# Patient Record
Sex: FEMALE | Race: WHITE
[De-identification: names, ages, dates, MRNs, and addresses within clinical notes are randomized per-mention and may not be internally consistent; named-entity substitution may affect disease eponyms.]

---

## 2017-02-10 RX ORDER — LEVOTHYROXINE SODIUM 75 MCG
TABLET ORAL
Qty: 90 TABLET | Refills: 1 | Status: SHIPPED | OUTPATIENT
Start: 2017-02-10 | End: 2017-08-07 | Stop reason: SDUPTHER

## 2017-07-06 ENCOUNTER — TELEPHONE (OUTPATIENT)
Dept: OTHER | Facility: CLINIC | Age: 52
End: 2017-07-06

## 2018-01-08 ENCOUNTER — TELEPHONE (OUTPATIENT)
Dept: INTERNAL MEDICINE CLINIC | Age: 53
End: 2018-01-08

## 2018-01-08 RX ORDER — LEVOTHYROXINE SODIUM 75 MCG
TABLET ORAL
Qty: 30 TABLET | Refills: 0 | Status: SHIPPED | OUTPATIENT
Start: 2018-01-08 | End: 2018-02-16 | Stop reason: SDUPTHER

## 2018-01-08 NOTE — TELEPHONE ENCOUNTER
Pt is going out of town on Wednesday. She stated that she has 30 pills left of her Synthroid. But she will be out of town til early February. She is requesting a early refill for Synthroid because she is afraid she may run out.   Please call meds into [facility] [address], [address] [facility]

## 2018-02-16 ENCOUNTER — OFFICE VISIT (OUTPATIENT)
Dept: INTERNAL MEDICINE CLINIC | Age: 53
End: 2018-02-16

## 2018-02-16 VITALS
DIASTOLIC BLOOD PRESSURE: 58 MMHG | SYSTOLIC BLOOD PRESSURE: 98 MMHG | HEART RATE: 74 BPM | OXYGEN SATURATION: 100 % | BODY MASS INDEX: 19.84 KG/M2 | WEIGHT: 112 LBS

## 2018-02-16 DIAGNOSIS — M85.89 OSTEOPENIA OF MULTIPLE SITES: ICD-10-CM

## 2018-02-16 DIAGNOSIS — E03.4 HYPOTHYROIDISM DUE TO ACQUIRED ATROPHY OF THYROID: ICD-10-CM

## 2018-02-16 DIAGNOSIS — Z12.31 ENCOUNTER FOR SCREENING MAMMOGRAM FOR BREAST CANCER: ICD-10-CM

## 2018-02-16 PROCEDURE — 1036F TOBACCO NON-USER: CPT | Performed by: INTERNAL MEDICINE

## 2018-02-16 PROCEDURE — G8427 DOCREV CUR MEDS BY ELIG CLIN: HCPCS | Performed by: INTERNAL MEDICINE

## 2018-02-16 PROCEDURE — 99213 OFFICE O/P EST LOW 20 MIN: CPT | Performed by: INTERNAL MEDICINE

## 2018-02-16 PROCEDURE — G8420 CALC BMI NORM PARAMETERS: HCPCS | Performed by: INTERNAL MEDICINE

## 2018-02-16 PROCEDURE — 3017F COLORECTAL CA SCREEN DOC REV: CPT | Performed by: INTERNAL MEDICINE

## 2018-02-16 PROCEDURE — 3014F SCREEN MAMMO DOC REV: CPT | Performed by: INTERNAL MEDICINE

## 2018-02-16 PROCEDURE — G8484 FLU IMMUNIZE NO ADMIN: HCPCS | Performed by: INTERNAL MEDICINE

## 2018-02-16 RX ORDER — LEVOTHYROXINE SODIUM 75 MCG
TABLET ORAL
Qty: 90 TABLET | Refills: 3 | Status: SHIPPED | OUTPATIENT
Start: 2018-02-16 | End: 2018-03-12

## 2018-02-16 ASSESSMENT — PATIENT HEALTH QUESTIONNAIRE - PHQ9
SUM OF ALL RESPONSES TO PHQ QUESTIONS 1-9: 0
1. LITTLE INTEREST OR PLEASURE IN DOING THINGS: 0
2. FEELING DOWN, DEPRESSED OR HOPELESS: 0
SUM OF ALL RESPONSES TO PHQ9 QUESTIONS 1 & 2: 0

## 2018-02-16 NOTE — PROGRESS NOTES
Results   Component Value Date    TSHREFLEX 0.23 09/13/2013     Lab Results   Component Value Date    TSH 2.68 11/15/2016    TSH 0.66 11/13/2015    TSH 2.58 10/24/2014     Osteopenia:  Taking adult gummies consistently, but unsure how much calcium and vitamin D they contain. Has been on dairy free diet for the past 30 days. Runs daily and plays tennis weekly, [employer] classes. Review of Systems  As documented in HPI     Physical Exam   Constitutional: She is oriented to person, place, and time. She appears well-developed and well-nourished. No distress. HENT:   Mouth/Throat: Oropharynx is clear and moist and mucous membranes are normal. No oropharyngeal exudate. Neck: Trachea normal and phonation normal. No JVD present. No tracheal deviation present. Thyromegaly (small goiter- no palpable nodules, non-tender) present. Cardiovascular: Normal rate, regular rhythm, normal heart sounds and intact distal pulses. Exam reveals no gallop and no friction rub. No murmur heard. Pulmonary/Chest: Effort normal and breath sounds normal. No stridor. No respiratory distress. She has no wheezes. She has no rales. She exhibits no tenderness. Lymphadenopathy:     She has no cervical adenopathy. Neurological: She is alert and oriented to person, place, and time. Skin: Skin is warm and dry. No rash noted. No erythema. Psychiatric: She has a normal mood and affect.  Her behavior is normal.

## 2018-02-16 NOTE — PATIENT INSTRUCTIONS
Calcium and Vitamin D     Why do I need calcium and vitamin D? Calcium and vitamin D are important for bone health. Nerves, muscles, and blood vessels need calcium to work. Vitamin D helps the body absorb calcium, and is needed for immune system function. There is some evidence that vitamin D helps prevent cancer and cardiovascular disease. What are sources of calcium and vitamin D? Calcium is found in foods. Dairy products are good sources. Eight ounces of yogurt (228 gram) or milk (1 cup [236 mL]), or a 1.5-oz. (43 gram) serving of cheese, can provide around 300 mg. Fortified orange juice can provide 300 mg per 8-oz. (236 mL) serving. Vitamin D is made by sun-exposed skin and is found in some foods. One of the best sources is salmon. A 3-oz. (86 gram) serving of sockeye salmon provides almost 800 IU. A 3-oz. serving of tuna canned in water provides about 150 IU. Dairy products fortified with vitamin D are good sources. Examples include a cup of fortified milk (115 to 124 IU), a cup of fortified orange juice (80 IU), or 6-ozs. (171 grams) of fortified yogurt (80 IU). Calcium and vitamin D are also available as supplements. Do I need a supplement? Are they safe? Many people are low on vitamin D. It is hard to get enough vitamin D from food, and most people don't get much sun exposure because they use sunscreens, spend long hours indoors, or live at a [address] Most people need a vitamin D supplement. Ask if you should have your vitamin D level checked. People typically get 300 mg calcium from their diet daily, not including dairy. If you include two servings of high-calcium foods (e.g., dairy), you can get around 900 mg per day. Supplementation with just 300 mg of calcium daily, or adding a third high-calcium serving, will provide 1200 mg daily. You may have heard calcium supplements are unsafe.  While there has been negative press about heart attacks and prostate cancer, calcium supplements have not

## 2018-03-12 RX ORDER — LEVOTHYROXINE SODIUM 75 MCG
TABLET ORAL
Qty: 30 TABLET | Refills: 5 | Status: SHIPPED | OUTPATIENT
Start: 2018-03-12 | End: 2018-09-02 | Stop reason: SDUPTHER

## 2018-03-12 NOTE — TELEPHONE ENCOUNTER
The following message was left on the Non-Emergency Line:  Patient calling to requesting a refill for Synthroid be sent to [facility].

## 2018-09-03 RX ORDER — LEVOTHYROXINE SODIUM 75 MCG
TABLET ORAL
Qty: 30 TABLET | Refills: 5 | Status: SHIPPED | OUTPATIENT
Start: 2018-09-03 | End: 2019-03-05 | Stop reason: SDUPTHER

## 2018-10-13 ENCOUNTER — TELEPHONE (OUTPATIENT)
Dept: PAIN MANAGEMENT | Age: 53
End: 2018-10-13

## 2018-12-13 ENCOUNTER — TELEPHONE (OUTPATIENT)
Dept: INTERNAL MEDICINE CLINIC | Age: 53
End: 2018-12-13

## 2018-12-13 NOTE — TELEPHONE ENCOUNTER
I spoke with patient and she is working out of town currently.   She will get completed by the end of Feb.

## 2019-03-01 ENCOUNTER — OFFICE VISIT (OUTPATIENT)
Dept: INTERNAL MEDICINE CLINIC | Age: 54
End: 2019-03-01
Payer: COMMERCIAL

## 2019-03-01 VITALS
HEIGHT: 63 IN | DIASTOLIC BLOOD PRESSURE: 60 MMHG | WEIGHT: 112 LBS | SYSTOLIC BLOOD PRESSURE: 100 MMHG | HEART RATE: 70 BPM | BODY MASS INDEX: 19.84 KG/M2 | OXYGEN SATURATION: 99 %

## 2019-03-01 DIAGNOSIS — Z78.0 POST-MENOPAUSAL: ICD-10-CM

## 2019-03-01 DIAGNOSIS — Z12.31 ENCOUNTER FOR SCREENING MAMMOGRAM FOR BREAST CANCER: ICD-10-CM

## 2019-03-01 DIAGNOSIS — E03.4 HYPOTHYROIDISM DUE TO ACQUIRED ATROPHY OF THYROID: Primary | ICD-10-CM

## 2019-03-01 DIAGNOSIS — M85.89 OSTEOPENIA OF MULTIPLE SITES: ICD-10-CM

## 2019-03-01 PROCEDURE — G8484 FLU IMMUNIZE NO ADMIN: HCPCS | Performed by: INTERNAL MEDICINE

## 2019-03-01 PROCEDURE — 1036F TOBACCO NON-USER: CPT | Performed by: INTERNAL MEDICINE

## 2019-03-01 PROCEDURE — 3017F COLORECTAL CA SCREEN DOC REV: CPT | Performed by: INTERNAL MEDICINE

## 2019-03-01 PROCEDURE — G8427 DOCREV CUR MEDS BY ELIG CLIN: HCPCS | Performed by: INTERNAL MEDICINE

## 2019-03-01 PROCEDURE — G8420 CALC BMI NORM PARAMETERS: HCPCS | Performed by: INTERNAL MEDICINE

## 2019-03-01 PROCEDURE — 99214 OFFICE O/P EST MOD 30 MIN: CPT | Performed by: INTERNAL MEDICINE

## 2019-03-01 ASSESSMENT — PATIENT HEALTH QUESTIONNAIRE - PHQ9
2. FEELING DOWN, DEPRESSED OR HOPELESS: 0
SUM OF ALL RESPONSES TO PHQ QUESTIONS 1-9: 0
SUM OF ALL RESPONSES TO PHQ9 QUESTIONS 1 & 2: 0
1. LITTLE INTEREST OR PLEASURE IN DOING THINGS: 0
SUM OF ALL RESPONSES TO PHQ QUESTIONS 1-9: 0

## 2019-03-05 ENCOUNTER — TELEPHONE (OUTPATIENT)
Dept: INTERNAL MEDICINE CLINIC | Age: 54
End: 2019-03-05

## 2019-03-05 ENCOUNTER — NURSE ONLY (OUTPATIENT)
Dept: INTERNAL MEDICINE CLINIC | Age: 54
End: 2019-03-05
Payer: COMMERCIAL

## 2019-03-05 DIAGNOSIS — Z12.11 COLON CANCER SCREENING: Primary | ICD-10-CM

## 2019-03-05 LAB
CONTROL: NORMAL
HEMOCCULT STL QL: NEGATIVE

## 2019-03-05 PROCEDURE — 82274 ASSAY TEST FOR BLOOD FECAL: CPT | Performed by: INTERNAL MEDICINE

## 2019-03-05 RX ORDER — LEVOTHYROXINE SODIUM 75 MCG
TABLET ORAL
Qty: 90 TABLET | Refills: 1 | Status: SHIPPED | OUTPATIENT
Start: 2019-03-05 | End: 2019-08-30 | Stop reason: SDUPTHER

## 2019-07-12 ENCOUNTER — APPOINTMENT (RX ONLY)
Dept: URBAN - METROPOLITAN AREA CLINIC 170 | Facility: CLINIC | Age: 54
Setting detail: DERMATOLOGY
End: 2019-07-12

## 2019-07-12 DIAGNOSIS — L81.4 OTHER MELANIN HYPERPIGMENTATION: ICD-10-CM

## 2019-07-12 DIAGNOSIS — L82.1 OTHER SEBORRHEIC KERATOSIS: ICD-10-CM

## 2019-07-12 DIAGNOSIS — L20.89 OTHER ATOPIC DERMATITIS: ICD-10-CM

## 2019-07-12 DIAGNOSIS — D22 MELANOCYTIC NEVI: ICD-10-CM

## 2019-07-12 DIAGNOSIS — D18.0 HEMANGIOMA: ICD-10-CM

## 2019-07-12 PROBLEM — D22.5 MELANOCYTIC NEVI OF TRUNK: Status: ACTIVE | Noted: 2019-07-12

## 2019-07-12 PROBLEM — D18.01 HEMANGIOMA OF SKIN AND SUBCUTANEOUS TISSUE: Status: ACTIVE | Noted: 2019-07-12

## 2019-07-12 PROBLEM — L30.9 DERMATITIS, UNSPECIFIED: Status: ACTIVE | Noted: 2019-07-12

## 2019-07-12 PROCEDURE — ? SUNSCREEN RECOMMENDATIONS

## 2019-07-12 PROCEDURE — ? PRESCRIPTION

## 2019-07-12 PROCEDURE — 99213 OFFICE O/P EST LOW 20 MIN: CPT

## 2019-07-12 PROCEDURE — ? COUNSELING

## 2019-07-12 RX ORDER — TRIAMCINOLONE ACETONIDE 1 MG/G
CREAM TOPICAL
Qty: 1 | Refills: 1 | Status: ERX | COMMUNITY
Start: 2019-07-12

## 2019-07-12 RX ADMIN — TRIAMCINOLONE ACETONIDE 1: 1 CREAM TOPICAL at 00:00

## 2019-07-12 ASSESSMENT — LOCATION SIMPLE DESCRIPTION DERM
LOCATION SIMPLE: RIGHT LOWER BACK
LOCATION SIMPLE: LEFT POSTERIOR UPPER ARM
LOCATION SIMPLE: UPPER BACK
LOCATION SIMPLE: RIGHT SHOULDER
LOCATION SIMPLE: LEFT CALF
LOCATION SIMPLE: ABDOMEN

## 2019-07-12 ASSESSMENT — LOCATION DETAILED DESCRIPTION DERM
LOCATION DETAILED: EPIGASTRIC SKIN
LOCATION DETAILED: LEFT PROXIMAL POSTERIOR UPPER ARM
LOCATION DETAILED: INFERIOR THORACIC SPINE
LOCATION DETAILED: LEFT DISTAL CALF
LOCATION DETAILED: RIGHT INFERIOR MEDIAL MIDBACK
LOCATION DETAILED: RIGHT ANTERIOR SHOULDER

## 2019-07-12 ASSESSMENT — LOCATION ZONE DERM
LOCATION ZONE: LEG
LOCATION ZONE: ARM
LOCATION ZONE: TRUNK

## 2019-07-12 NOTE — HPI: SKIN LESION
What Type Of Note Output Would You Prefer (Optional)?: Bullet Format
How Severe Is Your Skin Lesion?: mild
Has Your Skin Lesion Been Treated?: not been treated
Is This A New Presentation, Or A Follow-Up?: Skin Lesions
Additional History: Patient has one area on neck raised and itchy

## 2019-07-12 NOTE — HPI: RASH
What Type Of Note Output Would You Prefer (Optional)?: Bullet Format
How Severe Is Your Rash?: mild
Is This A New Presentation, Or A Follow-Up?: Rash
Additional History: Patient tried to do otc for two weeks

## 2019-08-30 ENCOUNTER — TELEPHONE (OUTPATIENT)
Dept: INTERNAL MEDICINE CLINIC | Age: 54
End: 2019-08-30

## 2019-08-30 RX ORDER — LEVOTHYROXINE SODIUM 75 MCG
TABLET ORAL
Qty: 90 TABLET | Refills: 1 | Status: SHIPPED | OUTPATIENT
Start: 2019-08-30 | End: 2020-02-28 | Stop reason: SDUPTHER

## 2019-08-30 NOTE — TELEPHONE ENCOUNTER
Patient left message on non-emergency line stating she is out of town and forgot to take her Synthroid. Patient wants to verify that it is okay to be without this medication for a few days. Please verify with patient at phone number provided. Patient also states she doesn't have any refills on this medication. There is 1 refill listed. Please clarify with patient.

## 2020-02-28 ENCOUNTER — TELEPHONE (OUTPATIENT)
Dept: INTERNAL MEDICINE CLINIC | Age: 55
End: 2020-02-28

## 2020-02-28 RX ORDER — LEVOTHYROXINE SODIUM 75 MCG
TABLET ORAL
Qty: 90 TABLET | Refills: 1 | Status: SHIPPED | OUTPATIENT
Start: 2020-02-28 | End: 2020-09-03 | Stop reason: SDUPTHER

## 2020-02-28 NOTE — TELEPHONE ENCOUNTER
Last appointment: 3/1/2019  Next appointment: Left message for patient to call need follow up  Last refill: 8/30/19

## 2020-02-28 NOTE — TELEPHONE ENCOUNTER
Patient scheduled. She would like a 90 day supply sent to pharmacy. Patient also is out of town for the 3 weeks and scheduled when she will be back in town.   Thank you

## 2020-02-28 NOTE — TELEPHONE ENCOUNTER
Patient called requesting refill of SYNTHROID 75 MCG tablet   Please send to [facility] [address] [facility] [phone] Lexi Rodriguez [phone]  Patient can be reached with any questions.   Thank you

## 2020-09-03 RX ORDER — LEVOTHYROXINE SODIUM 75 MCG
TABLET ORAL
Qty: 90 TABLET | Refills: 1 | Status: SHIPPED | OUTPATIENT
Start: 2020-09-03 | End: 2020-09-15 | Stop reason: SDUPTHER

## 2020-09-03 NOTE — TELEPHONE ENCOUNTER
----- Message from Fatoumata White sent at 9/3/2020  8:34 AM EDT -----  Subject: Refill Request    QUESTIONS  Name of Medication? SYNTHROID 75 MCG tablet  Patient-reported dosage and instructions? 1 tab a day   How many days do you have left? 9  [facility] Pharm[facility] [address] [address] [facility] (if available)? [phone]  Additional Information for Provider? patient states will be about 10 pills   short before her apt on the 15th and wanted to get her refill before.  ---------------------------------------------------------------------------  --------------  CALL BACK INFO  What is the best way for the office to contact you? OK to leave message on   voicemail  Preferred Call Back Phone Number?  [phone]

## 2020-09-14 NOTE — PROGRESS NOTES
9/15/2020    TELEHEALTH EVALUATION -- Audio/Visual (During QVYWN-04 public health emergency)    HPI:  Maryan Humphreys (:  1965) has requested an audio/video evaluation for the following concern(s):    Presyncope:  Has had several episodes of presyncope over the past 6 months. Has been dealing with marital separation over the past 9 months, which has been very stressful. Episodes tend to occur while meeting with  and estranged . No CP, SOB, palpitations or decrease in exercise tolerance. No episodes at work or in social situations. Patient denies anhedonia, depressed mood, difficulty concentrating, feelings of worthlessness/guilt, hopelessness, hypersomnia, insomnia, impaired memory, change in appetite or suicidal ideation. Has good support from family and friends. Hypothyroidism: Recent symptoms: none.  She denies fatigue, weight gain, heat or cold intolerance, constipation, dysphagia, diarrhea and palpitations. Patient is taking her medication consistently on an empty stomach.       Prior to Visit Medications    Medication Sig Taking? Authorizing Provider   Calcium 500 MG CHEW chewable tablet Take 500 mg by mouth 3 times daily as needed Yes Historical Provider, MD   ibuprofen (ADVIL;MOTRIN) 200 MG tablet Take 200 mg by mouth every 6 hours as needed for Pain Yes Historical Provider, MD   albuterol (PROAIR HFA) 108 (90 BASE) MCG/ACT inhaler Inhale 2 puffs into the lungs every 4 hours as needed for Wheezing Yes Pamella Harley MD   Multiple Vitamins-Minerals (MULTIVITAMIN GUMMIES ADULTS PO) Take 1 each by mouth daily.  Yes Historical Provider, MD   SYNTHROID 75 MCG tablet TAKE ONE TABLET BY MOUTH DAILY  Patient not taking: Reported on 9/15/2020  Pamella Harley MD       Wt Readings from Last 3 Encounters:   19 112 lb (50.8 kg)   18 112 lb (50.8 kg)   11/15/16 115 lb (52.2 kg)     BP Readings from Last 3 Encounters:   19 100/60   18 (!) 98/58   11/15/16 (!) 98/58 Patient-Reported Vitals 9/15/2020   Patient-Reported Weight 107lb   Patient-Reported Height -         Review of Systems:   As documented in HPI     Physical Exam  Constitutional:       General: She is not in acute distress. Appearance: She is well-developed. HENT:      Head: Normocephalic and atraumatic. Mouth/Throat:      Lips: No lesions. Neck:      Comments: No visible mass  Pulmonary:      Effort: Pulmonary effort is normal. No respiratory distress. Skin:     Comments: No rash, erythema or other discoloration on facial skin and exposed areas of neck and upper extremites    Neurological:      Mental Status: She is alert. Comments: No facial asymmetry (cranial nerve 7 motor function) or gaze palsy   Psychiatric:         Attention and Perception: Attention normal.         Mood and Affect: Mood normal.         Speech: Speech normal.         Behavior: Behavior normal.         Thought Content: Thought content normal.         Cognition and Memory: Cognition normal.          Lab Results   Component Value Date     (H) 10/24/2014    LDLCALC 108 (H) 10/24/2014     No results found for: GLUF, LABA1C  Lab Results   Component Value Date     11/15/2016    K 4.6 11/15/2016    BUN 20 11/15/2016    CREATININE 0.8 11/15/2016    LABGLOM >60 11/15/2016    GFRAA >60 11/15/2016    CALCIUM 9.8 11/15/2016    VITD25 52.4 02/16/2018     Lab Results   Component Value Date    ALT 15 11/15/2016    AST 25 11/15/2016     Lab Results   Component Value Date    HGB 12.5 06/23/2014     Lab Results   Component Value Date    TSHREFLEX 0.23 (L) 09/13/2013    TSH 2.13 03/01/2019           ASSESSMENT/PLAN:  1. Pre-syncope  Clinical picture most consistent with vasovagal etiology secondary to stressful events related to marital separation. Since she is feeling well otherwise and has no symptoms of depression, medication is not currently warranted. Recommended CBT with Dr. Jorge Cosby, which she will schedule. Patient will call if symptoms change or worsen. 2. Hypothyroidism due to acquired atrophy of thyroid  Clinically euthyroid, but will adjust levothyroxine dose if indicated by lab results. She will be switching from brand name to generic levothyroxine due to cost, so may need more frequent lab monitoring.  - TSH without Reflex; Future    3. Thyroid nodule  Asymptomatic. Not currently interested in repeating US. 4. Osteopenia of multiple sites  Continue vitamin D and calcium supplements at current doses and weight-bearing exercise. Not currently interested in repeating dexa scan- last completed in 2014.    5. Multiple nevi  Requests dermatology referral.  - Santino Walker MD, Dermatology, [facility]    Return in about 6 months (around 3/15/2021) for 30 MIN F/U- Video. An  electronic signature was used to authenticate this note. --Tamar Esqueda MD on 9/15/2020 at 3:54 PM    Pursuant to the emergency declaration under the [facility], [facility] waiver authority and the [employer] and [employer] Act, this Virtual  Visit was conducted, with patient's consent, to reduce the patient's risk of exposure to COVID-19 and provide continuity of care for an established patient. Services were provided through a video synchronous discussion virtually to substitute for in-person clinic visit.

## 2020-09-15 ENCOUNTER — TELEMEDICINE (OUTPATIENT)
Dept: INTERNAL MEDICINE CLINIC | Age: 55
End: 2020-09-15

## 2020-09-15 ENCOUNTER — TELEPHONE (OUTPATIENT)
Dept: INTERNAL MEDICINE CLINIC | Age: 55
End: 2020-09-15

## 2020-09-15 PROBLEM — D22.9 MULTIPLE NEVI: Status: ACTIVE | Noted: 2020-09-15

## 2020-09-15 PROBLEM — R55 PRE-SYNCOPE: Status: ACTIVE | Noted: 2020-09-15

## 2020-09-15 PROCEDURE — G8427 DOCREV CUR MEDS BY ELIG CLIN: HCPCS | Performed by: INTERNAL MEDICINE

## 2020-09-15 PROCEDURE — 99214 OFFICE O/P EST MOD 30 MIN: CPT | Performed by: INTERNAL MEDICINE

## 2020-09-15 PROCEDURE — 3017F COLORECTAL CA SCREEN DOC REV: CPT | Performed by: INTERNAL MEDICINE

## 2020-09-15 RX ORDER — LEVOTHYROXINE SODIUM 0.07 MG/1
TABLET ORAL
Qty: 90 TABLET | Refills: 1 | Status: SHIPPED | OUTPATIENT
Start: 2020-09-15 | End: 2021-03-16 | Stop reason: SDUPTHER

## 2020-09-15 ASSESSMENT — PATIENT HEALTH QUESTIONNAIRE - PHQ9
SUM OF ALL RESPONSES TO PHQ QUESTIONS 1-9: 0
1. LITTLE INTEREST OR PLEASURE IN DOING THINGS: 0
2. FEELING DOWN, DEPRESSED OR HOPELESS: 0
SUM OF ALL RESPONSES TO PHQ QUESTIONS 1-9: 0
SUM OF ALL RESPONSES TO PHQ9 QUESTIONS 1 & 2: 0

## 2020-09-15 NOTE — LETTER
[facility]  [address] [facility] [address]  Phone: [phone]  Fax: [phone]    Tamar Esqueda MD        September 15, 2020    Coco Lopez  [address] [place] [address]      To whom it may concern:    Breanna Castillo has experienced several recent episodes of pre-syncope, which may be triggered or exacerbated by stress. It is my medical opinion that she should avoid stressful situations as much as possible until this issue can be evaluated and treated.     Sincerely,        Tamar Esqueda MD

## 2020-09-15 NOTE — PATIENT INSTRUCTIONS
[facility] - Janeen Michel MD  [address], [address] [facility], [address]  Ph: [phone]    Outside Dermatologists:    Cedrick Abel.  Isiah Skiff, MD Lauree Babcock)  [phone]     [address] [facility]  Linda Starkey, General Dermatology  [facility] - atypical wounds  [phone] [facility] [phone]  Aiyana Ann             [phone]  [address]  [address] [facility]

## 2020-09-15 NOTE — Clinical Note
Please contact patient to schedule follow-up appointment as documented. She also would like to schedule an appointment with Dr. Elise Rinne.

## 2020-09-15 NOTE — LETTER
[facility]  [address] [facility] [address]  Phone: [phone]  Fax: [phone]    Uriel Pisano MD        September 15, 2020    Anderson Steele  [address] [place], [place] [id number]      To whom it may concern:    Jyoti Dodd has experienced several recent episodes of pre-syncope, which may be triggered or exacerbated by stress. It is my medical opinion that she should avoid stressful situations as much as possible until this issue can be evaluated and treated.     Sincerely,        Uriel Pisano MD

## 2020-09-15 NOTE — TELEPHONE ENCOUNTER
Spoke with patient. [facility] says virtual visits during [facility] are no charge to the patient. Please issue refund. If there is any issue, please call patient.

## 2020-09-15 NOTE — TELEPHONE ENCOUNTER
----- Message from Luis Reardonnic sent at 9/15/2020 12:39 PM EDT -----  Subject: Message to Provider    QUESTIONS  Information for Provider? Patient has paid her copay for her office visit   that is set for today at 4. Her insurance says there is no charge for this   visit and she would like a refund.  ---------------------------------------------------------------------------  --------------  [address]  What is the best way for the office to contact you? OK to leave message on   voicemail  Preferred Call Back Phone Number? [phone]  ---------------------------------------------------------------------------  --------------  SCRIPT ANSWERS  Relationship to Patient?  Self

## 2020-09-23 ENCOUNTER — TELEPHONE (OUTPATIENT)
Dept: INTERNAL MEDICINE CLINIC | Age: 55
End: 2020-09-23

## 2020-09-23 NOTE — TELEPHONE ENCOUNTER
Patient calling stating she is entitled to 8 free office visits with Dr. Britney Mckeon. Please don't bill insurance.

## 2020-09-25 NOTE — TELEPHONE ENCOUNTER
Called patient to discuss billing issue. I am not aware of [facility] offering 10 free visits. Maybe she gets 10 free visits through her employer, so she wants her EAP billed and not her health insurance. I am not sure how this works though. I would have to look into it or if she has instructions for her EAP, I could follow those. I called and left a voicemail to discuss this with her.

## 2020-09-25 NOTE — TELEPHONE ENCOUNTER
I know that [address] [facility] offers 10 free visits to see a therapist, so perhaps, [facility] does as well. I just wasn't sure as to whether or not we attach the insurance information, or do the do not bill insurance option.

## 2020-10-02 ENCOUNTER — VIRTUAL VISIT (OUTPATIENT)
Dept: PSYCHOLOGY | Age: 55
End: 2020-10-02
Payer: COMMERCIAL

## 2020-10-02 PROCEDURE — 90791 PSYCH DIAGNOSTIC EVALUATION: CPT | Performed by: PSYCHOLOGIST

## 2020-10-02 NOTE — PROGRESS NOTES
Behavioral Health Consultation  Elizabeth Cee Psy.D. Psychologist  10/2/2020  Start time 3:30pm Stop time 4:10pm    Time spent with Patient: 40 minutes  This is patient's first [facility] appointment. Reason for Consult:  anxiety  Referring Provider: PCP    Feedback for PCP: No action needed. Writer will continue to follow pt. At initial visit, pt provided informed consent for the behavioral health program. Discussed with patient model of service to include the limits of confidentiality (i.e. abuse reporting, suicide intervention, etc.) and short-term intervention focused approach. Pt indicated understanding    TELEHEALTH VISIT -- Audio and video (During GVTLB-05 public health emergency)  }  Pursuant to the emergency declaration under the [address] [place] [address], [facility] waiver authority and the [employer] Act, this visit was conducted, with patient's consent, to reduce the patient's risk of exposure to COVID-19 and provide continuity of care for an established patient. Services were provided through a telehealth discussion to substitute for in-person clinic visit. Pt gave verbal informed consent to participate in telehealth services. Consent:  She and/or health care decision maker is aware that that she may receive a bill for this service, depending on her insurance coverage, and has provided verbal consent to proceed: Yes    Conducted a risk-benefit analysis and determined that the patient's presenting problems are consistent with the use of telepsychology. Determined that the patient has sufficient knowledge and skills in the use of technology enabling them to adequately benefit from telepsychology. It was determined that this patient was able to be properly treated without an in-person session. Patient verified that they were currently located at the [facility] address that was provided during registration.     Verified the following information:  Patient's identification: Yes  Patient location: [address] [place] [address]  Patient's call back number: [phone]   Patient's emergency contact's name and number, as well as permission to contact them if needed: Extended Emergency Contact Information  Primary Emergency Contact: Fausto Evans  Mobile Phone: [phone]  Relation: Child  Preferred language: English     Provider location: [place], [address] affirm this is an episode with a patient who has not had a related appointment within my department in the past 7 days or scheduled within the next 24 hours. S:    Patient reports that she has been dealing with stress related to her divorce. Her  is a , which makes the situation more complex. Her  had an affair in 2016 and asked for a divorce in January 2019. He eventually moved out in December 2019. Patient states that she is hoping to make this marriage work, but  is not open to this. He filed a restraining order. She feels that as Christians, they should be models for maintaining their marriage. Patient expresses that she gets very anxious when she has to talk to her  or do anything related to the divorce.  Depression sx: Denied   Anxiety sx: excessive worry, uncontrollable worry, irritability, muscle tension and sleep disturbance, worries about divorce and wanting to make her marriage work.    SI/HI: Vashti Collins Coping skills: distancing self from thoughts about divorce, talking to friends    History:    Health habits:   Sleep: good   Exercise: unknown   Medication adherence: yes    Psychiatric history:   Current psychotropic medications: Denied   Past mental health treatment: saw a therapist once and found it helpful; couple's counseling one visit    Social History:    Social supports:  (asked for divorce 12/2019), family, friends, is not able to talk to friends from [employer] due to restraining order   Family psychiatric history: Denied   Employment: [employer]   Sabianist/spiritual beliefs: Scientologist    Social History     Tobacco Use    Smoking status: Never Smoker    Smokeless tobacco: Never Used   Substance Use Topics    Alcohol use: Yes     Comment: Occasional      Social History     Substance and Sexual Activity   Drug Use No      Current Outpatient Medications   Medication Sig Dispense Refill    levothyroxine (SYNTHROID) 75 MCG tablet TAKE ONE TABLET BY MOUTH DAILY 90 tablet 1    Calcium 500 MG CHEW chewable tablet Take 500 mg by mouth 3 times daily as needed      ibuprofen (ADVIL;MOTRIN) 200 MG tablet Take 200 mg by mouth every 6 hours as needed for Pain      albuterol (PROAIR HFA) 108 (90 BASE) MCG/ACT inhaler Inhale 2 puffs into the lungs every 4 hours as needed for Wheezing 1 Inhaler 3    Multiple Vitamins-Minerals (MULTIVITAMIN GUMMIES ADULTS PO) Take 1 each by mouth daily. No current facility-administered medications for this visit. O:  MSE:    Appearance: good hygiene   Attitude: cooperative and friendly  Consciousness: alert  Orientation: oriented to person, place, time, general circumstance  Memory: recent and remote memory intact  Attention/Concentration: intact during session  Psychomotor Activity:normal  Eye Contact: normal  Speech: normal rate and volume, well-articulated  Mood: anxious  Affect: anxious  Perception: within normal limits  Thought Content: within normal limits  Thought Process: logical, coherent and goal-directed  Insight: good  Judgment: intact  Ability to understand instructions: Yes  Ability to respond meaningfully: Yes  Morbid Ideation: no   Suicide Assessment: no suicidal ideation, plan, or intent  Homicidal Ideation: no    A:  Administered PHQ-9 (see below).   PHQ Scores 9/15/2020 3/1/2019 2/16/2018 5/13/2016   PHQ2 Score 0 0 0 0   PHQ9 Score 0 0 0 0     Interpretation of Total Score Depression Severity: 1-4 = Minimal depression, 5-9 = Mild depression, 10-14 = Moderate depression, 15-19 = Moderately severe depression, 20-27 = Severe depression    Assessment/Progress in treatment/Treatment plan:  Patient appears to be experiencing anxiety, exacerbated by going through a divorce and wishing that she could get back together with her . Current coping skills include positive self-talk and factors maintaining symptoms include lack of acceptance of current situation. May benefit from brief intervention from writer for adaptive coping skill development. Will refer to specialty mental health in the future if indicated. Diagnosis:    1. Adjustment disorder with anxious mood       Patient Active Problem List   Diagnosis    Hypothyroidism    Thyroid nodule    Osteopenia    Pre-syncope    Multiple nevi        Plan:  Set the following goals: 1) identify top of 5 values at this time in your life. Brainstormed different options that you have currently and rank these options based on values 2) diaphragmatic breathing    Follow-up:   Return in about 2 weeks (around 10/16/2020).      Pt interventions:  Established rapport, [place]-setting to identify pt's primary goals for [employer] [facility] visit / overall health, Supportive techniques, Provided Psychoeducation re: anxiety, Engaged in treatment planning, Engaged in values clarification, Set plan for engaging in more value-guided action and Trained in diaphragmatic breathing

## 2020-10-05 ENCOUNTER — TELEPHONE (OUTPATIENT)
Dept: PSYCHOLOGY | Age: 55
End: 2020-10-05

## 2020-10-05 NOTE — LETTER
[facility]  [address] [facility] [address] [facility] [address]  Phone: [phone]  Fax: [phone]    Shellia Sandifer, PSYD        October 5, 2020    To whom it may concern,    Alvin Randolph is receiving behavioral health treatment from this provider due to stress and anxiety associated with current divorce proceedings. She will be receiving treatment on a bi-weekly basis for 6-10 visits.       Sincerely,          Shellia Sandifer, PSYD

## 2020-10-06 ENCOUNTER — TELEPHONE (OUTPATIENT)
Dept: INTERNAL MEDICINE CLINIC | Age: 55
End: 2020-10-06

## 2020-10-06 NOTE — TELEPHONE ENCOUNTER
----- Message from Yanira Santacruz sent at 10/6/2020  1:32 PM EDT -----  Subject: Message to Provider    QUESTIONS  Information for Provider? Patient would like to schedule appointment with   Dr. Roxana Zavala. She could no longer hold on and hung up . Please send message   to staff to call patient back. ---------------------------------------------------------------------------  --------------  [facility]  What is the best way for the office to contact you? OK to leave message on   voicemail  Preferred Call Back Phone Number? [phone]  ---------------------------------------------------------------------------  --------------  SCRIPT ANSWERS  Relationship to Patient?  Self

## 2020-10-09 ENCOUNTER — VIRTUAL VISIT (OUTPATIENT)
Dept: PSYCHOLOGY | Age: 55
End: 2020-10-09
Payer: COMMERCIAL

## 2020-10-09 PROCEDURE — 90832 PSYTX W PT 30 MINUTES: CPT | Performed by: PSYCHOLOGIST

## 2020-10-09 NOTE — PATIENT INSTRUCTIONS
Identify what it is that you need from your  and ask for it. If she cannot provide you with that, then you can seek another . Discussed what postponing is really doing for it. Is it actually helping.

## 2020-10-09 NOTE — PROGRESS NOTES
Behavioral Health Consultation  Chery Chaves Psy.D. Psychologist  10/9/2020  Start time 8:03am Stop time 8:36am    Time spent with Patient: 33 minutes  This is patient's second [facility] appointment. Reason for Consult:  anxiety  Referring Provider: PCP    Feedback for PCP: No action needed. Writer will continue to follow pt. At initial visit, pt provided informed consent for the behavioral health program. Discussed with patient model of service to include the limits of confidentiality (i.e. abuse reporting, suicide intervention, etc.) and short-term intervention focused approach. Pt indicated understanding    TELEHEALTH VISIT -- Audio and video (During DXUVD-24 public health emergency)  }  Pursuant to the emergency declaration under the [facility], [facility] waiver authority and the [employer] and [employer] Act, this visit was conducted, with patient's consent, to reduce the patient's risk of exposure to COVID-19 and provide continuity of care for an established patient. Services were provided through a telehealth discussion to substitute for in-person clinic visit. Pt gave verbal informed consent to participate in telehealth services. Consent:  She and/or health care decision maker is aware that that she may receive a bill for this service, depending on her insurance coverage, and has provided verbal consent to proceed: Yes    Conducted a risk-benefit analysis and determined that the patient's presenting problems are consistent with the use of telepsychology. Determined that the patient has sufficient knowledge and skills in the use of technology enabling them to adequately benefit from telepsychology. It was determined that this patient was able to be properly treated without an in-person session. Patient verified that they were currently located at the [facility] address that was provided during registration.     Verified the following information:  Patient's identification: Yes  Patient location: [address] [place] [address]  Patient's call back number: [phone]   Patient's emergency contact's name and number, as well as permission to contact them if needed: Extended Emergency Contact Information  Primary Emergency Contact: Wendy Wise  Mobile Phone: [phone]  Relation: Child  Preferred language: English     Provider location: [address] [place] [address], [address] [place] [address] this is an episode with a patient who has not had a related appointment within my department in the past 7 days or scheduled within the next 24 hours. S:    Pt set the following goals at last visit: 1) identify top of 5 values at this time in your life. Brainstormed different options that you have currently and rank these options based on values 2) diaphragmatic breathing    Patient reports that she did a values quiz. Has identified her top values as being integrity and perseverance. Identified her for options related to the divorce as being settling out of court, going to court to fight for her assets, changing the grounds for divorce, or seeking postponement of the trial.  Patient identifies that she is trying to postpone the divorce because she is hoping that her  will come back. She feels that she has \"failed\" her marriage. Is embarrassed. Is not sure who she fears judgment from.  Depression sx: Denied   Anxiety sx: excessive worry, uncontrollable worry, irritability, muscle tension and sleep disturbance, worries about divorce and wanting to make her marriage work.    SI/HI: [place] [employer] Coping skills: distancing self from thoughts about divorce, talking to friends    History:    Health habits:   Sleep: good   Exercise: unknown   Medication adherence: yes    Psychiatric history:   Current psychotropic medications: Denied   Past mental health treatment: saw a therapist once and found it helpful; couple's counseling one visit    Social History:  Social supports:  (asked for divorce 12/2019), family, friends, is not able to talk to friends from [employer] due to restraining order   Family psychiatric history: Denied   Employment: [employer]   Congregation/spiritual beliefs: Evangelical    Social History     Tobacco Use    Smoking status: Never Smoker    Smokeless tobacco: Never Used   Substance Use Topics    Alcohol use: Yes     Comment: Occasional      Social History     Substance and Sexual Activity   Drug Use No      Current Outpatient Medications   Medication Sig Dispense Refill    levothyroxine (SYNTHROID) 75 MCG tablet TAKE ONE TABLET BY MOUTH DAILY 90 tablet 1    Calcium 500 MG CHEW chewable tablet Take 500 mg by mouth 3 times daily as needed      ibuprofen (ADVIL;MOTRIN) 200 MG tablet Take 200 mg by mouth every 6 hours as needed for Pain      albuterol (PROAIR HFA) 108 (90 BASE) MCG/ACT inhaler Inhale 2 puffs into the lungs every 4 hours as needed for Wheezing 1 Inhaler 3    Multiple Vitamins-Minerals (MULTIVITAMIN GUMMIES ADULTS PO) Take 1 each by mouth daily. No current facility-administered medications for this visit.          O:  MSE:    Appearance: good hygiene   Attitude: cooperative and friendly  Consciousness: alert  Orientation: oriented to person, place, time, general circumstance  Memory: recent and remote memory intact  Attention/Concentration: intact during session  Psychomotor Activity:normal  Eye Contact: normal  Speech: normal rate and volume, well-articulated  Mood: anxious  Affect: anxious  Perception: within normal limits  Thought Content: within normal limits  Thought Process: logical, coherent and goal-directed  Insight: good  Judgment: intact  Ability to understand instructions: Yes  Ability to respond meaningfully: Yes  Morbid Ideation: no   Suicide Assessment: no suicidal ideation, plan, or intent  Homicidal Ideation: no    A:  Not completed due to COVID limitations    Assessment/Progress in treatment/Treatment

## 2020-10-15 NOTE — PROGRESS NOTES
Behavioral Health Consultation  Adán Buchanan Psy.D. Psychologist  10/16/2020    Start time 2:30pm  Stop time 3:03pm    Time spent with Patient: 33 minutes  This is patient's third [facility] appointment. Reason for Consult:  anxiety  Referring Provider: PCP    Feedback for PCP: No action needed. Writer will continue to follow pt. At initial visit, pt provided informed consent for the behavioral health program. Discussed with patient model of service to include the limits of confidentiality (i.e. abuse reporting, suicide intervention, etc.) and short-term intervention focused approach. Pt indicated understanding    TELEHEALTH VISIT -- Audio and video (During CCUCV-39 public health emergency)  }  Pursuant to the emergency declaration under the [facility], [facility] waiver authority and the [employer] and [employer] Act, this visit was conducted, with patient's consent, to reduce the patient's risk of exposure to COVID-19 and provide continuity of care for an established patient. Services were provided through a telehealth discussion to substitute for in-person clinic visit. Pt gave verbal informed consent to participate in telehealth services. Consent:  She and/or health care decision maker is aware that that she may receive a bill for this service, depending on her insurance coverage, and has provided verbal consent to proceed: Yes    Conducted a risk-benefit analysis and determined that the patient's presenting problems are consistent with the use of telepsychology. Determined that the patient has sufficient knowledge and skills in the use of technology enabling them to adequately benefit from telepsychology. It was determined that this patient was able to be properly treated without an in-person session. Patient verified that they were currently located at the [facility] address that was provided during registration.     Verified the following information:  Patient's identification: Yes  Patient location: [address] [place] [address]  Patient's call back number: [phone]   Patient's emergency contact's name and number, as well as permission to contact them if needed: Extended Emergency Contact Information  Primary Emergency Contact: Shar Pastor  Mobile Phone: [phone]  Relation: Child  Preferred language: English     Provider location: [address] [place] [address], [address] [place] [address] this is an episode with a patient who has not had a related appointment within my department in the past 7 days or scheduled within the next 24 hours. S:    Pt set the following goals at last visit: 1) identify what you are willing to do with regards to the divorce and see if your  can support you in that. 2) continue deep breathing     Patient reports that she contacted her . Her  is out of town, so she has not been able to talk to her yet. Patient told  generally what she was needing. Has not developed more specific guidelines of what she is needing. Patient reports that she has been thinking about what she wants her \"legacy\" to be. Specifically with regards to the situation. Is having difficulties identifying how she wants to approach the situation.  Depression sx: Denied   Anxiety sx: excessive worry, uncontrollable worry, irritability, muscle tension and sleep disturbance, worries about divorce and wanting to make her marriage work.    SI/HI: Eldon Johnson Coping skills: distancing self from thoughts about divorce, talking to friends    History:    Health habits:   Sleep: good   Exercise: unknown   Medication adherence: yes    Psychiatric history:   Current psychotropic medications: Denied   Past mental health treatment: saw a therapist once and found it helpful; couple's counseling one visit    Social History:    Social supports:  (asked for divorce 12/2019), family, friends, is not able to talk to friends from [employer] due to restraining order   Family psychiatric history: Denied   Employment: [employer]   Scientologist/spiritual beliefs: Mu-ism    Social History     Tobacco Use    Smoking status: Never Smoker    Smokeless tobacco: Never Used   Substance Use Topics    Alcohol use: Yes     Comment: Occasional      Social History     Substance and Sexual Activity   Drug Use No      Current Outpatient Medications   Medication Sig Dispense Refill    levothyroxine (SYNTHROID) 75 MCG tablet TAKE ONE TABLET BY MOUTH DAILY 90 tablet 1    Calcium 500 MG CHEW chewable tablet Take 500 mg by mouth 3 times daily as needed      ibuprofen (ADVIL;MOTRIN) 200 MG tablet Take 200 mg by mouth every 6 hours as needed for Pain      albuterol (PROAIR HFA) 108 (90 BASE) MCG/ACT inhaler Inhale 2 puffs into the lungs every 4 hours as needed for Wheezing 1 Inhaler 3    Multiple Vitamins-Minerals (MULTIVITAMIN GUMMIES ADULTS PO) Take 1 each by mouth daily. No current facility-administered medications for this visit.          O:  MSE:    Appearance: good hygiene   Attitude: cooperative and friendly  Consciousness: alert  Orientation: oriented to person, place, time, general circumstance  Memory: recent and remote memory intact  Attention/Concentration: intact during session  Psychomotor Activity:normal  Eye Contact: normal  Speech: normal rate and volume, well-articulated  Mood: anxious  Affect: anxious  Perception: within normal limits  Thought Content: within normal limits  Thought Process: logical, coherent and goal-directed  Insight: good  Judgment: intact  Ability to understand instructions: Yes  Ability to respond meaningfully: Yes  Morbid Ideation: no   Suicide Assessment: no suicidal ideation, plan, or intent  Homicidal Ideation: no    A:  Not completed due to COVID limitations    Assessment/Progress in treatment/Treatment plan:  Patient appears to be experiencing anxiety, exacerbated by going through a divorce and wishing that she could get back together with her . Current coping skills include positive self-talk, identifying values, and factors maintaining symptoms include lack of acceptance of current situation. Is engaged in behavioral health treatment. May benefit from additional brief intervention from writer for adaptive coping skill development. Will refer to specialty mental health in the future if indicated. Diagnosis:    1. Adjustment disorder with anxious mood       Patient Active Problem List   Diagnosis    Hypothyroidism    Thyroid nodule    Osteopenia    Pre-syncope    Multiple nevi        Plan:  Set the following     Follow-up:   Return in about 1 week (around 10/23/2020).      Pt interventions:  Established rapport, [place]-setting to identify pt's primary goals for [facility] visit / overall health, Supportive techniques, Engaged in treatment planning, Engaged in values clarification, Set plan for engaging in more value-guided action, Praised pt for use of skills and Cognitive strategies to target maladaptive thoughts including Black and white thinking

## 2020-10-16 ENCOUNTER — TELEMEDICINE (OUTPATIENT)
Dept: PSYCHOLOGY | Age: 55
End: 2020-10-16
Payer: COMMERCIAL

## 2020-10-16 PROCEDURE — 90832 PSYTX W PT 30 MINUTES: CPT | Performed by: PSYCHOLOGIST

## 2020-10-30 ENCOUNTER — TELEMEDICINE (OUTPATIENT)
Dept: PSYCHOLOGY | Age: 55
End: 2020-10-30
Payer: COMMERCIAL

## 2020-10-30 PROCEDURE — 90832 PSYTX W PT 30 MINUTES: CPT | Performed by: PSYCHOLOGIST

## 2020-10-30 NOTE — PROGRESS NOTES
Behavioral Health Consultation  Elisabet Alston Psy.D. Psychologist  10/30/2020    Start time 4:00pm  Stop time 4:35pm    Time spent with Patient: 35 minutes  This is patient's fourth [facility] appointment. Reason for Consult:  anxiety  Referring Provider: PCP    Feedback for PCP: No action needed. Writer will continue to follow pt. At initial visit, pt provided informed consent for the behavioral health program. Discussed with patient model of service to include the limits of confidentiality (i.e. abuse reporting, suicide intervention, etc.) and short-term intervention focused approach. Pt indicated understanding    TELEHEALTH VISIT -- Audio and video (During DLMKT-15 public health emergency)  }  Pursuant to the emergency declaration under the [facility], [facility] waiver authority and the [employer] and [employer] Act, this visit was conducted, with patient's consent, to reduce the patient's risk of exposure to COVID-19 and provide continuity of care for an established patient. Services were provided through a telehealth discussion to substitute for in-person clinic visit. Pt gave verbal informed consent to participate in telehealth services. Consent:  She and/or health care decision maker is aware that that she may receive a bill for this service, depending on her insurance coverage, and has provided verbal consent to proceed: Yes    Conducted a risk-benefit analysis and determined that the patient's presenting problems are consistent with the use of telepsychology. Determined that the patient has sufficient knowledge and skills in the use of technology enabling them to adequately benefit from telepsychology. It was determined that this patient was able to be properly treated without an in-person session. Patient verified that they were currently located at the [facility] address that was provided during registration.     Verified the following information:  Patient's identification: Yes  Patient location: [address] [place], [place] [id number]  Patient's call back number: [phone]   Patient's emergency contact's name and number, as well as permission to contact them if needed: Extended Emergency Contact Information  Primary Emergency Contact: Katiana Carpio [address] Phone: [phone]  Mobile Phone: [phone]  Relation: Child  Preferred language: English     Provider location: [address] affirm this is an episode with a patient who has not had a related appointment within my department in the past 7 days or scheduled within the next 24 hours. S:    Patient reports that she feels that she is started to come to a place of acceptance about her divorce. She is feeling inspired to fight for what she deserves in her divorce and hold her  accountable to his role in the [employer]. Feels that this is in line with her values. States that her court date was also pushed back to the beginning of January, which she feels good about.  Depression sx: Denied   Anxiety sx: excessive worry, uncontrollable worry, irritability, muscle tension and sleep disturbance, worries about divorce and wanting to make her marriage work.    SI/HI: [place] Regalado Coping skills: distancing self from thoughts about divorce, talking to friends    History:    Health habits:   Sleep: good   Exercise: unknown   Medication adherence: yes    Psychiatric history:   Current psychotropic medications: Denied   Past mental health treatment: saw a therapist once and found it helpful; couple's counseling one visit    Social History:    Social supports:  (asked for divorce 2019), family, friends, is not able to talk to friends from [employer] due to restraining order   Family psychiatric history: Denied   Employment: [employer]   Mandaeism/spiritual beliefs: Yazdanism    Social History     Tobacco Use    Smoking status: Never Smoker    Smokeless tobacco: Never Used Substance Use Topics    Alcohol use: Yes     Comment: Occasional      Social History     Substance and Sexual Activity   Drug Use No      Current Outpatient Medications   Medication Sig Dispense Refill    levothyroxine (SYNTHROID) 75 MCG tablet TAKE ONE TABLET BY MOUTH DAILY 90 tablet 1    Calcium 500 MG CHEW chewable tablet Take 500 mg by mouth 3 times daily as needed      ibuprofen (ADVIL;MOTRIN) 200 MG tablet Take 200 mg by mouth every 6 hours as needed for Pain      albuterol (PROAIR HFA) 108 (90 BASE) MCG/ACT inhaler Inhale 2 puffs into the lungs every 4 hours as needed for Wheezing 1 Inhaler 3    Multiple Vitamins-Minerals (MULTIVITAMIN GUMMIES ADULTS PO) Take 1 each by mouth daily. No current facility-administered medications for this visit. O:  MSE:    Appearance: good hygiene   Attitude: cooperative and friendly  Consciousness: alert  Orientation: oriented to person, place, time, general circumstance  Memory: recent and remote memory intact  Attention/Concentration: intact during session  Psychomotor Activity:normal  Eye Contact: normal  Speech: normal rate and volume, well-articulated  Mood: anxious  Affect: anxious  Perception: within normal limits  Thought Content: within normal limits  Thought Process: logical, coherent and goal-directed  Insight: good  Judgment: intact  Ability to understand instructions: Yes  Ability to respond meaningfully: Yes  Morbid Ideation: no   Suicide Assessment: no suicidal ideation, plan, or intent  Homicidal Ideation: no    A:  Not completed due to COVID limitations    Assessment/Progress in treatment/Treatment plan:  Patient appears to be experiencing anxiety, exacerbated by going through a divorce and wishing that she could get back together with her . Current coping skills include positive self-talk, identifying values, Bahai beliefs. Is engaged in behavioral health treatment.   May benefit from additional brief intervention from writer for adaptive coping skill development. Will refer to specialty mental health in the future if indicated. Diagnosis:    1. Adjustment disorder with anxious mood       Patient Active Problem List   Diagnosis    Hypothyroidism    Thyroid nodule    Osteopenia    Pre-syncope    Multiple nevi        Plan:    Follow-up:   Return in about 4 weeks (around 11/27/2020).      Pt interventions:  Established rapport, [place]-setting to identify pt's primary goals for [facility] visit / overall health, Supportive techniques, Engaged in treatment planning, Engaged in values clarification, Set plan for engaging in more value-guided action and Praised pt for use of skills

## 2020-11-18 ENCOUNTER — TELEPHONE (OUTPATIENT)
Dept: PSYCHOLOGY | Age: 55
End: 2020-11-18

## 2020-11-20 NOTE — TELEPHONE ENCOUNTER
Pt states she has a program through work where she should have 10 free visits with Dr. Ameya Tamez but she is receiving bills.  Advised patient there is currently a zero balance for her

## 2020-12-11 ENCOUNTER — TELEMEDICINE (OUTPATIENT)
Dept: PSYCHOLOGY | Age: 55
End: 2020-12-11
Payer: COMMERCIAL

## 2020-12-11 PROCEDURE — 90832 PSYTX W PT 30 MINUTES: CPT | Performed by: PSYCHOLOGIST

## 2020-12-11 NOTE — PROGRESS NOTES
address that was provided during registration. Verified the following information:  Patient's identification: Yes  Patient location: [address] [place] [id number]  Patient's call back number: [phone]   Patient's emergency contact's name and number, as well as permission to contact them if needed: Extended Emergency Contact Information  Primary Emergency Contact: Katiana Rodriguez [place] [address] Phone: [phone]  Mobile Phone: [phone]  Relation: Child  Preferred language: English     Provider location: [address] [place] [address], [address] [place] [address] affirm this is an episode with a patient who has not had a related appointment within my department in the past 7 days or scheduled within the next 24 hours. S:    Patient reports that her mood continues to be significantly improved. Has developed a plan for her divorce and feels confident in the decisions that she is making. States that her court date is on January 14. Is no longer feeling anxious about the divorce. Is starting to feel hopeful about her life moving forward. Denies need for additional follow-up at this time.      Depression sx: Denied   Anxiety sx: excessive worry, uncontrollable worry, irritability, muscle tension and sleep disturbance, worries about divorce and wanting to make her marriage work, but this has improved   SI/HI: Denied   Coping skills: distancing self from thoughts about divorce, talking to friends, reading books, lisa, prayer    History:    Health habits:   Sleep: good   Exercise: running group   Medication adherence: yes    Psychiatric history:   Current psychotropic medications: Denied   Past mental health treatment: saw a therapist once and found it helpful; couple's counseling one visit    Social History:    Social supports:  (asked for divorce 12/2019), family, friends, is not able to talk to friends from [employer] due to restraining order   Family psychiatric history: Denied   Employment: [employer]   Muslim/spiritual beliefs: Mormonism    Social History     Tobacco Use    Smoking status: Never Smoker    Smokeless tobacco: Never Used   Substance Use Topics    Alcohol use: Yes     Comment: Occasional      Social History     Substance and Sexual Activity   Drug Use No      Current Outpatient Medications   Medication Sig Dispense Refill    levothyroxine (SYNTHROID) 75 MCG tablet TAKE ONE TABLET BY MOUTH DAILY 90 tablet 1    Calcium 500 MG CHEW chewable tablet Take 500 mg by mouth 3 times daily as needed      ibuprofen (ADVIL;MOTRIN) 200 MG tablet Take 200 mg by mouth every 6 hours as needed for Pain      albuterol (PROAIR HFA) 108 (90 BASE) MCG/ACT inhaler Inhale 2 puffs into the lungs every 4 hours as needed for Wheezing 1 Inhaler 3    Multiple Vitamins-Minerals (MULTIVITAMIN GUMMIES ADULTS PO) Take 1 each by mouth daily. No current facility-administered medications for this visit. O:  MSE:    Appearance: good hygiene   Attitude: cooperative and friendly  Consciousness: alert  Orientation: oriented to person, place, time, general circumstance  Memory: recent and remote memory intact  Attention/Concentration: intact during session  Psychomotor Activity:normal  Eye Contact: normal  Speech: normal rate and volume, well-articulated  Mood: good  Affect: euthymic  Perception: within normal limits  Thought Content: within normal limits  Thought Process: logical, coherent and goal-directed  Insight: good  Judgment: intact  Ability to understand instructions: Yes  Ability to respond meaningfully: Yes  Morbid Ideation: no   Suicide Assessment: no suicidal ideation, plan, or intent  Homicidal Ideation: no    A:  Not completed due to COVID limitations    Assessment/Progress in treatment/Treatment plan:  Patient initially presented with anxiety, exacerbated by going through a divorce and wishing that she could get back together with her .  Current coping skills include positive self-talk, identifying values, Zoroastrianism beliefs. Is engaged in behavioral health treatment and is implementing new coping skills. Reports significant improvements in mood. Denies need for additional follow-up at this time. Diagnosis:    1. Adjustment disorder with anxious mood       Patient Active Problem List   Diagnosis    Hypothyroidism    Thyroid nodule    Osteopenia    Pre-syncope    Multiple nevi        Plan:    Follow-up:   Return if symptoms worsen or fail to improve.      Pt interventions:  Established rapport, [place]-setting to identify pt's primary goals for [facility] [facility] visit / overall health, Supportive techniques, Engaged in treatment planning, Engaged in values clarification, Set plan for engaging in more value-guided action and Praised pt for use of skills, terminated current episode of care

## 2021-03-15 PROBLEM — F43.22 ADJUSTMENT DISORDER WITH ANXIOUS MOOD: Status: ACTIVE | Noted: 2020-09-15

## 2021-03-16 ENCOUNTER — VIRTUAL VISIT (OUTPATIENT)
Dept: INTERNAL MEDICINE CLINIC | Age: 56
End: 2021-03-16
Payer: COMMERCIAL

## 2021-03-16 ENCOUNTER — IMMUNIZATION (OUTPATIENT)
Dept: FAMILY MEDICINE CLINIC | Age: 56
End: 2021-03-16
Payer: COMMERCIAL

## 2021-03-16 DIAGNOSIS — M85.89 OSTEOPENIA OF MULTIPLE SITES: ICD-10-CM

## 2021-03-16 DIAGNOSIS — F43.22 ADJUSTMENT DISORDER WITH ANXIOUS MOOD: ICD-10-CM

## 2021-03-16 DIAGNOSIS — E03.4 HYPOTHYROIDISM DUE TO ACQUIRED ATROPHY OF THYROID: Primary | ICD-10-CM

## 2021-03-16 DIAGNOSIS — E04.1 THYROID NODULE: ICD-10-CM

## 2021-03-16 PROCEDURE — G8427 DOCREV CUR MEDS BY ELIG CLIN: HCPCS | Performed by: INTERNAL MEDICINE

## 2021-03-16 PROCEDURE — 3017F COLORECTAL CA SCREEN DOC REV: CPT | Performed by: INTERNAL MEDICINE

## 2021-03-16 PROCEDURE — 91300 COVID-19, PFIZER VACCINE 30MCG/0.3ML DOSE: CPT | Performed by: FAMILY MEDICINE

## 2021-03-16 PROCEDURE — 0001A COVID-19, PFIZER VACCINE 30MCG/0.3ML DOSE: CPT | Performed by: FAMILY MEDICINE

## 2021-03-16 PROCEDURE — 99214 OFFICE O/P EST MOD 30 MIN: CPT | Performed by: INTERNAL MEDICINE

## 2021-03-16 RX ORDER — LEVOTHYROXINE SODIUM 0.07 MG/1
TABLET ORAL
Qty: 90 TABLET | Refills: 1 | Status: SHIPPED | OUTPATIENT
Start: 2021-03-16 | End: 2021-08-02 | Stop reason: SDUPTHER

## 2021-03-16 ASSESSMENT — PATIENT HEALTH QUESTIONNAIRE - PHQ9
1. LITTLE INTEREST OR PLEASURE IN DOING THINGS: 0
SUM OF ALL RESPONSES TO PHQ QUESTIONS 1-9: 0
SUM OF ALL RESPONSES TO PHQ QUESTIONS 1-9: 0

## 2021-03-16 NOTE — ASSESSMENT & PLAN NOTE
Doing well clinically and from lab standpoint with switch from brand name Synthroid to generic- continue current dose.

## 2021-03-16 NOTE — PROGRESS NOTES
Fit test mailed to patient.
Lips: No lesions. Neck:      Comments: No visible mass  Pulmonary:      Effort: Pulmonary effort is normal. No respiratory distress. Skin:     Comments: No rash, erythema or other discoloration on facial skin and exposed areas of neck and upper extremites    Neurological:      Mental Status: She is alert. Comments: No facial asymmetry (cranial nerve 7 motor function) or gaze palsy   Psychiatric:         Attention and Perception: Attention normal.         Mood and Affect: Mood normal.         Speech: Speech normal.         Behavior: Behavior normal.         Thought Content: Thought content normal.         Cognition and Memory: Cognition normal.       On this date 3/16/2021 I have spent 35 minutes reviewing previous notes, test results and face to face with the patient discussing the diagnosis and importance of compliance with the treatment plan as well as documenting on the day of the visit. Kye Lira is a 60 y.o. female being evaluated by a Virtual Visit (video visit) encounter to address concerns as mentioned above. A caregiver was present when appropriate. Due to this being a TeleHealth encounter (During IXN-86 public health emergency), evaluation of the following organ systems was limited: Vitals/Constitutional/EENT/Resp/CV/GI//MS/Neuro/Skin/Heme-Lymph-Imm. Pursuant to the emergency declaration under the [address] [place] [address] authority and the [employer] Act, this Virtual Visit was conducted with patient's (and/or legal guardian's) consent, to reduce the patient's risk of exposure to COVID-19 and provide necessary medical care. The patient (and/or legal guardian) has also been advised to contact this office for worsening conditions or problems, and seek emergency medical treatment and/or call 911 if deemed necessary.      Patient identification was verified at the start of the visit: Yes

## 2021-03-16 NOTE — ASSESSMENT & PLAN NOTE
Continue calcium and vitamin D supplement at current dose and weight-bearing exercise. Declines repeat dexa scan.

## 2021-04-06 ENCOUNTER — IMMUNIZATION (OUTPATIENT)
Dept: FAMILY MEDICINE CLINIC | Age: 56
End: 2021-04-06
Payer: COMMERCIAL

## 2021-04-08 PROCEDURE — 0002A COVID-19, PFIZER VACCINE 30MCG/0.3ML DOSE: CPT | Performed by: FAMILY MEDICINE

## 2021-04-08 PROCEDURE — 91300 COVID-19, PFIZER VACCINE 30MCG/0.3ML DOSE: CPT | Performed by: FAMILY MEDICINE

## 2021-05-06 ENCOUNTER — NURSE ONLY (OUTPATIENT)
Dept: INTERNAL MEDICINE CLINIC | Age: 56
End: 2021-05-06
Payer: COMMERCIAL

## 2021-05-06 DIAGNOSIS — Z12.12 SCREENING FOR COLORECTAL CANCER: Primary | ICD-10-CM

## 2021-05-06 DIAGNOSIS — E03.4 HYPOTHYROIDISM DUE TO ACQUIRED ATROPHY OF THYROID: ICD-10-CM

## 2021-05-06 DIAGNOSIS — Z12.11 SCREENING FOR COLORECTAL CANCER: Primary | ICD-10-CM

## 2021-05-06 LAB
CONTROL: ABNORMAL
HEMOCCULT STL QL: POSITIVE

## 2021-05-06 PROCEDURE — 82274 ASSAY TEST FOR BLOOD FECAL: CPT | Performed by: INTERNAL MEDICINE

## 2021-08-02 RX ORDER — LEVOTHYROXINE SODIUM 0.07 MG/1
TABLET ORAL
Qty: 90 TABLET | Refills: 0 | Status: SHIPPED | OUTPATIENT
Start: 2021-08-02 | End: 2021-12-13

## 2021-08-02 NOTE — TELEPHONE ENCOUNTER
----- Message from Beena Mendez sent at 8/2/2021 12:46 PM EDT -----  Subject: Medication Problem    QUESTIONS  Name of Medication? levothyroxine (SYNTHROID) 75 MCG tablet  Patient-reported dosage and instructions? 75 mg per day   What question or problem do you have with the medication? n/a  Preferred Pharmacy? [address] [place] [address], [address]  Pharmacy phone number (if available)? [phone]  Additional Information for Provider? PT r/s to 10/22 will need refill   before date of appt  ---------------------------------------------------------------------------  --------------  CALL BACK INFO  What is the best way for the office to contact you? OK to leave message on   voicemail  Preferred Call Back Phone Number? [phone]  ---------------------------------------------------------------------------  --------------  SCRIPT ANSWERS  Relationship to Patient?  Self

## 2021-08-20 ENCOUNTER — NURSE ONLY (OUTPATIENT)
Dept: INTERNAL MEDICINE CLINIC | Age: 56
End: 2021-08-20
Payer: COMMERCIAL

## 2021-08-20 DIAGNOSIS — Z12.11 SCREENING FOR COLON CANCER: Primary | ICD-10-CM

## 2021-08-20 LAB
CONTROL: NORMAL
HEMOCCULT STL QL: NORMAL

## 2021-08-20 PROCEDURE — 82274 ASSAY TEST FOR BLOOD FECAL: CPT | Performed by: INTERNAL MEDICINE

## 2021-08-23 ENCOUNTER — APPOINTMENT (RX ONLY)
Dept: URBAN - METROPOLITAN AREA CLINIC 170 | Facility: CLINIC | Age: 56
Setting detail: DERMATOLOGY
End: 2021-08-23

## 2021-08-23 DIAGNOSIS — D18.0 HEMANGIOMA: ICD-10-CM | Status: STABLE

## 2021-08-23 DIAGNOSIS — L82.1 OTHER SEBORRHEIC KERATOSIS: ICD-10-CM | Status: STABLE

## 2021-08-23 DIAGNOSIS — D22 MELANOCYTIC NEVI: ICD-10-CM | Status: STABLE

## 2021-08-23 DIAGNOSIS — L81.4 OTHER MELANIN HYPERPIGMENTATION: ICD-10-CM | Status: STABLE

## 2021-08-23 DIAGNOSIS — L40.8 OTHER PSORIASIS: ICD-10-CM | Status: INADEQUATELY CONTROLLED

## 2021-08-23 PROBLEM — D18.01 HEMANGIOMA OF SKIN AND SUBCUTANEOUS TISSUE: Status: ACTIVE | Noted: 2021-08-23

## 2021-08-23 PROBLEM — D22.5 MELANOCYTIC NEVI OF TRUNK: Status: ACTIVE | Noted: 2021-08-23

## 2021-08-23 PROBLEM — D48.5 NEOPLASM OF UNCERTAIN BEHAVIOR OF SKIN: Status: ACTIVE | Noted: 2021-08-23

## 2021-08-23 PROCEDURE — ? SUNSCREEN RECOMMENDATIONS

## 2021-08-23 PROCEDURE — 11102 TANGNTL BX SKIN SINGLE LES: CPT

## 2021-08-23 PROCEDURE — ? BIOPSY BY SHAVE METHOD

## 2021-08-23 PROCEDURE — ? PRESCRIPTION

## 2021-08-23 PROCEDURE — 99214 OFFICE O/P EST MOD 30 MIN: CPT | Mod: 25

## 2021-08-23 PROCEDURE — ? FULL BODY SKIN EXAM

## 2021-08-23 PROCEDURE — ? TREATMENT REGIMEN

## 2021-08-23 PROCEDURE — ? COUNSELING

## 2021-08-23 PROCEDURE — ? ADDITIONAL NOTES

## 2021-08-23 RX ORDER — KETOCONAZOLE 20 MG/ML
SHAMPOO, SUSPENSION TOPICAL
Qty: 1 | Refills: 2 | Status: ERX | COMMUNITY
Start: 2021-08-23

## 2021-08-23 RX ORDER — CLOBETASOL PROPIONATE 0.5 MG/ML
SOLUTION TOPICAL
Qty: 1 | Refills: 1 | Status: ERX | COMMUNITY
Start: 2021-08-23

## 2021-08-23 RX ADMIN — CLOBETASOL PROPIONATE 1: 0.5 SOLUTION TOPICAL at 00:00

## 2021-08-23 RX ADMIN — KETOCONAZOLE 1: 20 SHAMPOO, SUSPENSION TOPICAL at 00:00

## 2021-08-23 ASSESSMENT — SEVERITY ASSESSMENT: HOW SEVERE IS THIS PATIENT'S CONDITION?: MODERATE

## 2021-08-23 ASSESSMENT — LOCATION DETAILED DESCRIPTION DERM
LOCATION DETAILED: INFERIOR THORACIC SPINE
LOCATION DETAILED: LEFT INFERIOR PARIETAL SCALP
LOCATION DETAILED: RIGHT ANTERIOR SHOULDER
LOCATION DETAILED: EPIGASTRIC SKIN
LOCATION DETAILED: RIGHT INFERIOR MEDIAL MIDBACK
LOCATION DETAILED: RIGHT INFERIOR PARIETAL SCALP

## 2021-08-23 ASSESSMENT — LOCATION ZONE DERM
LOCATION ZONE: ARM
LOCATION ZONE: TRUNK
LOCATION ZONE: SCALP

## 2021-08-23 ASSESSMENT — LOCATION SIMPLE DESCRIPTION DERM
LOCATION SIMPLE: ABDOMEN
LOCATION SIMPLE: RIGHT SHOULDER
LOCATION SIMPLE: RIGHT LOWER BACK
LOCATION SIMPLE: SCALP
LOCATION SIMPLE: UPPER BACK

## 2021-08-23 NOTE — PROCEDURE: BIOPSY BY SHAVE METHOD
Detail Level: Detailed
Depth Of Biopsy: dermis
Was A Bandage Applied: Yes
Size Of Lesion In Cm: 0.6
X Size Of Lesion In Cm: 0
Anticipated Plan (Based On Presumed Biopsy Results): Call with results
Biopsy Type: H and E
Biopsy Method: double edge Personna blade
Anesthesia Type: 1% Xylocaine without epinephrine
Anesthesia Volume In Cc (Will Not Render If 0): 2
Hemostasis: Aluminum Chloride and Electrocautery
Wound Care: Bacitracin
Dressing: Band-Aid
Destruction After The Procedure: No
Type Of Destruction Used: Electrodesiccation and Curettage
Curettage Text: The wound bed was treated with curettage after the biopsy was performed.
Cryotherapy Text: The wound bed was treated with cryotherapy after the biopsy was performed.
Electrodesiccation Text: The wound bed was treated with electrodesiccation after the biopsy was performed.
Electrodesiccation And Curettage Text: The wound bed was treated with electrodesiccation and curettage after the biopsy was performed.
Silver Nitrate Text: The wound bed was treated with silver nitrate after the biopsy was performed.
Lab: -[id number]
Lab Facility: [id number]
Consent: Written consent was obtained and risks were reviewed including but not limited to scarring, infection, bleeding, scabbing, incomplete removal, nerve damage and allergy to anesthesia.
Post-Care Instructions: I reviewed with the patient in detail post-care instructions. Patient is to keep the biopsy site dry overnight, and then apply polysporin, vaseline or aquaphor twice daily until healed.
Notification Instructions: Patient will be notified of biopsy results. However, patient instructed to call the office if not contacted within 2 weeks.
Billing Type: Third-Party Bill
Information: Selecting Yes will display possible errors in your note based on the variables you have selected. This validation is only offered as a suggestion for you. PLEASE NOTE THAT THE VALIDATION TEXT WILL BE REMOVED WHEN YOU FINALIZE YOUR NOTE. IF YOU WANT TO FAX A PRELIMINARY NOTE YOU WILL NEED TO TOGGLE THIS TO 'NO' IF YOU DO NOT WANT IT IN YOUR FAXED NOTE.

## 2021-08-23 NOTE — HPI: EVALUATION OF SKIN LESION(S)
What Type Of Note Output Would You Prefer (Optional)?: Bullet Format
Hpi Title: Evaluation of Skin Lesions
How Severe Are Your Spot(S)?: mild
Have Your Spot(S) Been Treated In The Past?: has not been treated
Additional History: Patient states she is here for a skin check, she states she has noticed flaking spots on different spots of her body

## 2021-09-17 ENCOUNTER — APPOINTMENT (RX ONLY)
Dept: URBAN - METROPOLITAN AREA CLINIC 170 | Facility: CLINIC | Age: 56
Setting detail: DERMATOLOGY
End: 2021-09-17

## 2021-09-17 PROBLEM — C44.712 BASAL CELL CARCINOMA OF SKIN OF RIGHT LOWER LIMB, INCLUDING HIP: Status: ACTIVE | Noted: 2021-09-17

## 2021-09-17 PROCEDURE — ? COUNSELING

## 2021-09-17 PROCEDURE — ? ADDITIONAL NOTES

## 2021-09-17 PROCEDURE — ? CURETTAGE AND DESTRUCTION

## 2021-09-17 PROCEDURE — 17262 DSTRJ MAL LES T/A/L 1.1-2.0: CPT

## 2021-10-20 NOTE — PROGRESS NOTES
10/22/2021    Driss Rasmussen (: 1965) is a 60 y.o. female who presents for a preventive medicine evaluation. Patient Active Problem List   Diagnosis    Hypothyroidism    Thyroid nodule    Osteopenia    Adjustment disorder with anxious mood    Multiple nevi    Thrombophlebitis of superficial veins of left lower extremity       Review of Systems   Constitutional: Negative. HENT: Negative. Eyes: Negative. Respiratory: Negative. Cardiovascular: Negative. Gastrointestinal: Negative. Genitourinary: Negative. Musculoskeletal:        New varicose vein left medial lower leg- slightly erythematous and tender to the touch. Has also noticed some swelling in ankles L > R.   Skin: Negative. Neurological: Negative. Psychiatric/Behavioral: Negative. No Known Allergies  Prior to Visit Medications    Medication Sig Taking? Authorizing Provider   levothyroxine (SYNTHROID) 75 MCG tablet TAKE ONE TABLET BY MOUTH DAILY Yes Nicolle Lennon MD   Calcium 500 MG CHEW chewable tablet Take 500 mg by mouth 3 times daily as needed Yes Historical Provider, MD   ibuprofen (ADVIL;MOTRIN) 200 MG tablet Take 200 mg by mouth every 6 hours as needed for Pain Yes Historical Provider, MD   Multiple Vitamins-Minerals (MULTIVITAMIN GUMMIES ADULTS PO) Take 1 each by mouth daily.  Yes Historical Provider, MD        Past Medical History:   Diagnosis Date    Hypothyroidism     Toenail deformity     Varicose veins      Past Surgical History:   Procedure Laterality Date    VARICOSE VEIN SURGERY  07    Stab phlebectomy bilateral LE, left saphenous vein RFA     Family History   Problem Relation Age of Onset    Arrhythmia Mother         A. Fib. pacer    Osteoporosis Mother     Cancer Mother         Basal cell skin cancer    Other Father         Klever Emily    Arrhythmia Maternal Grandmother         Pacer    No Known Problems Sister     No Known Problems Brother     No Known Problems Maternal Aunt     No Known Problems Maternal Uncle     No Known Problems Paternal Aunt     No Known Problems Paternal Uncle     No Known Problems Maternal Grandfather     No Known Problems Paternal Grandmother     No Known Problems Paternal Grandfather     No Known Problems Other     Rheum Arthritis Neg Hx     Osteoarthritis Neg Hx     Asthma Neg Hx     Breast Cancer Neg Hx     Diabetes Neg Hx     Heart Failure Neg Hx     High Cholesterol Neg Hx     Hypertension Neg Hx     Migraines Neg Hx     Ovarian Cancer Neg Hx     Rashes/Skin Problems Neg Hx     Seizures Neg Hx     Stroke Neg Hx     Thyroid Disease Neg Hx        Vitals:    10/22/21 1540   BP: 96/60   Pulse: 60   Resp: 16   SpO2: 98%   Weight: 108 lb (49 kg)   Height: 5' 3\" (1.6 m)      Estimated body mass index is 19.13 kg/m² as calculated from the following:    Height as of this encounter: 5' 3\" (1.6 m). Weight as of this encounter: 108 lb (49 kg). Wt Readings from Last 3 Encounters:   10/22/21 108 lb (49 kg)   03/01/19 112 lb (50.8 kg)   02/16/18 112 lb (50.8 kg)     BP Readings from Last 3 Encounters:   10/22/21 96/60   03/01/19 100/60   02/16/18 (!) 98/58     Physical Exam  Constitutional:       General: She is not in acute distress. Appearance: She is well-developed. HENT:      Head: Normocephalic and atraumatic. Right Ear: Tympanic membrane, ear canal and external ear normal.      Left Ear: Tympanic membrane, ear canal and external ear normal.   Eyes:      General: Lids are normal.      Conjunctiva/sclera: Conjunctivae normal.      Pupils: Pupils are equal, round, and reactive to light. Neck:      Thyroid: No thyroid mass or thyromegaly. Vascular: No carotid bruit. Cardiovascular:      Rate and Rhythm: Normal rate and regular rhythm. Heart sounds: Normal heart sounds. No murmur heard. No friction rub. No gallop. Pulmonary:      Effort: Pulmonary effort is normal. No respiratory distress. Breath sounds: Normal breath sounds. No wheezing, rhonchi or rales. Abdominal:      General: Bowel sounds are normal. There is no distension. Palpations: Abdomen is soft. There is no mass. Tenderness: There is no abdominal tenderness. Musculoskeletal:         General: Normal range of motion. Cervical back: Neck supple. Right lower leg: Edema (trace) present. Left lower leg: Edema (+1 ankle) present. Comments: Multiple varicosities bilateral lower legs. 7 cm erythematous, linear lesion over medial lower left leg- slightly tender to palpation   Lymphadenopathy:      Cervical: No cervical adenopathy. Skin:     General: Skin is warm and dry. Findings: No erythema or rash. Comments: No suspicious lesions. Neurological:      Mental Status: She is alert and oriented to person, place, and time. Coordination: Coordination normal.      Deep Tendon Reflexes: Reflexes are normal and symmetric. Psychiatric:         Speech: Speech normal.         Behavior: Behavior normal.         Thought Content: Thought content normal.         Judgment: Judgment normal.       Lab Results   Component Value Date     (H) 10/24/2014    LDLCALC 108 (H) 10/24/2014     No results found for: GLUF, LABA1C  Lab Results   Component Value Date     11/15/2016    K 4.6 11/15/2016    BUN 20 11/15/2016    CREATININE 0.8 11/15/2016    LABGLOM >60 11/15/2016    GFRAA >60 11/15/2016    CALCIUM 9.8 11/15/2016    VITD25 52.4 02/16/2018     Lab Results   Component Value Date    ALT 15 11/15/2016    AST 25 11/15/2016     Lab Results   Component Value Date    HGB 12.5 06/23/2014     Lab Results   Component Value Date    TSHREFLEX 0.23 (L) 09/13/2013    TSH 2.59 03/12/2021        Preventive Care:  Eye exam within the past 2 years? yes  Dental exam within the past year? yes  Seatbelt used consistently: yes  Working smoke and carbon monoxide detectors in home: yes   Avoiding any major food groups? No  Exercising at least 150 minutes/week? yes  Advance Directive: Y, but needs updating    Social History     Tobacco Use    Smoking status: Never Smoker    Smokeless tobacco: Never Used   Substance Use Topics    Alcohol use: Yes     Comment: Occasional     Social History     Substance and Sexual Activity   Sexual Activity Yes    Partners: Male    Birth control/protection: Condom       Immunization History   Administered Date(s) Administered    COVID-19, Champion Peter, PF, 30mcg/0.3mL 03/16/2021, 04/08/2021    Tdap (Boostrix, Adacel) 10/07/2008     Health Maintenance   Topic Date Due    Shingles Vaccine (1 of 2) Never done    Breast cancer screen  12/23/2016    DEXA (modify frequency per FRAX score)  08/29/2018    DTaP/Tdap/Td vaccine (2 - Td or Tdap) 10/07/2018    Lipid screen  10/24/2019    Flu vaccine (1) Never done    Cervical cancer screen  11/15/2021    TSH testing  03/12/2022    Colon Cancer Screen FIT/FOBT  08/20/2022    COVID-19 Vaccine  Completed    Hepatitis A vaccine  Aged Out    Hepatitis B vaccine  Aged Out    Hib vaccine  Aged Out    Meningococcal (ACWY) vaccine  Aged Out    Pneumococcal 0-64 years Vaccine  Aged Out    Hepatitis C screen  Discontinued    HIV screen  Discontinued       Assessment/Plan:  Encounter for well adult exam without abnormal findings  - Patient plans to schedule mammogram and can obtain free flu shot at work  - Tdap declined  - Shingrix declined  - Dexa declined  - PAP/pelvic/breast exam declined  - Lipid panel declined  Hypothyroidism due to acquired atrophy of thyroid  Assessment & Plan:  Clinically euthyroid. Continue current dose of Synthroid. Recheck TSH in 6 months. Orders:  -     TSH without Reflex; Future  Thrombophlebitis of superficial veins of left lower extremity  Assessment & Plan:  Supportive care guidelines provided. Recommended doppler to rule out deep vein involvement, but patient declined. Patient will call if symptoms change or worsen. Return in about 1 year (around 10/22/2022) for CPE.    --Mau Cisneros MD on 10/22/2021 at 4:26 PM    An electronic signature was used to authenticate this note.

## 2021-10-22 ENCOUNTER — OFFICE VISIT (OUTPATIENT)
Dept: INTERNAL MEDICINE CLINIC | Age: 56
End: 2021-10-22
Payer: COMMERCIAL

## 2021-10-22 VITALS
HEART RATE: 60 BPM | RESPIRATION RATE: 16 BRPM | OXYGEN SATURATION: 98 % | WEIGHT: 108 LBS | SYSTOLIC BLOOD PRESSURE: 96 MMHG | HEIGHT: 63 IN | DIASTOLIC BLOOD PRESSURE: 60 MMHG | BODY MASS INDEX: 19.14 KG/M2

## 2021-10-22 DIAGNOSIS — Z00.00 ENCOUNTER FOR WELL ADULT EXAM WITHOUT ABNORMAL FINDINGS: Primary | ICD-10-CM

## 2021-10-22 DIAGNOSIS — I80.02 THROMBOPHLEBITIS OF SUPERFICIAL VEINS OF LEFT LOWER EXTREMITY: ICD-10-CM

## 2021-10-22 DIAGNOSIS — E03.4 HYPOTHYROIDISM DUE TO ACQUIRED ATROPHY OF THYROID: ICD-10-CM

## 2021-10-22 PROCEDURE — 99396 PREV VISIT EST AGE 40-64: CPT | Performed by: INTERNAL MEDICINE

## 2021-10-22 PROCEDURE — G8484 FLU IMMUNIZE NO ADMIN: HCPCS | Performed by: INTERNAL MEDICINE

## 2021-10-22 SDOH — ECONOMIC STABILITY: TRANSPORTATION INSECURITY
IN THE PAST 12 MONTHS, HAS LACK OF TRANSPORTATION KEPT YOU FROM MEETINGS, WORK, OR FROM GETTING THINGS NEEDED FOR DAILY LIVING?: NO

## 2021-10-22 SDOH — ECONOMIC STABILITY: INCOME INSECURITY: IN THE LAST 12 MONTHS, WAS THERE A TIME WHEN YOU WERE NOT ABLE TO PAY THE MORTGAGE OR RENT ON TIME?: NO

## 2021-10-22 SDOH — ECONOMIC STABILITY: TRANSPORTATION INSECURITY
IN THE PAST 12 MONTHS, HAS THE LACK OF TRANSPORTATION KEPT YOU FROM MEDICAL APPOINTMENTS OR FROM GETTING MEDICATIONS?: NO

## 2021-10-22 SDOH — ECONOMIC STABILITY: HOUSING INSECURITY
IN THE LAST 12 MONTHS, WAS THERE A TIME WHEN YOU DID NOT HAVE A STEADY PLACE TO SLEEP OR SLEPT IN A SHELTER (INCLUDING NOW)?: NO

## 2021-10-22 SDOH — ECONOMIC STABILITY: FOOD INSECURITY: WITHIN THE PAST 12 MONTHS, YOU WORRIED THAT YOUR FOOD WOULD RUN OUT BEFORE YOU GOT MONEY TO BUY MORE.: NEVER TRUE

## 2021-10-22 SDOH — HEALTH STABILITY: PHYSICAL HEALTH: ON AVERAGE, HOW MANY MINUTES DO YOU ENGAGE IN EXERCISE AT THIS LEVEL?: 50 MIN

## 2021-10-22 SDOH — HEALTH STABILITY: PHYSICAL HEALTH: ON AVERAGE, HOW MANY DAYS PER WEEK DO YOU ENGAGE IN MODERATE TO STRENUOUS EXERCISE (LIKE A BRISK WALK)?: 3 DAYS

## 2021-10-22 SDOH — ECONOMIC STABILITY: FOOD INSECURITY: WITHIN THE PAST 12 MONTHS, THE FOOD YOU BOUGHT JUST DIDN'T LAST AND YOU DIDN'T HAVE MONEY TO GET MORE.: NEVER TRUE

## 2021-10-22 ASSESSMENT — ENCOUNTER SYMPTOMS
GASTROINTESTINAL NEGATIVE: 1
EYES NEGATIVE: 1
RESPIRATORY NEGATIVE: 1

## 2021-10-22 ASSESSMENT — LIFESTYLE VARIABLES: HOW OFTEN DO YOU HAVE A DRINK CONTAINING ALCOHOL: NEVER

## 2021-10-22 ASSESSMENT — SOCIAL DETERMINANTS OF HEALTH (SDOH)
WITHIN THE LAST YEAR, HAVE YOU BEEN HUMILIATED OR EMOTIONALLY ABUSED IN OTHER WAYS BY YOUR PARTNER OR EX-PARTNER?: NO
WITHIN THE LAST YEAR, HAVE TO BEEN RAPED OR FORCED TO HAVE ANY KIND OF SEXUAL ACTIVITY BY YOUR PARTNER OR EX-PARTNER?: NO
HOW OFTEN DO YOU ATTENT MEETINGS OF THE CLUB OR ORGANIZATION YOU BELONG TO?: NEVER
WITHIN THE LAST YEAR, HAVE YOU BEEN KICKED, HIT, SLAPPED, OR OTHERWISE PHYSICALLY HURT BY YOUR PARTNER OR EX-PARTNER?: NO
HOW OFTEN DO YOU ATTEND CHURCH OR RELIGIOUS SERVICES?: NEVER
DO YOU BELONG TO ANY CLUBS OR ORGANIZATIONS SUCH AS CHURCH GROUPS UNIONS, FRATERNAL OR ATHLETIC GROUPS, OR SCHOOL GROUPS?: NO
WITHIN THE LAST YEAR, HAVE YOU BEEN AFRAID OF YOUR PARTNER OR EX-PARTNER?: NO
HOW OFTEN DO YOU GET TOGETHER WITH FRIENDS OR RELATIVES?: THREE TIMES A WEEK
IN A TYPICAL WEEK, HOW MANY TIMES DO YOU TALK ON THE PHONE WITH FAMILY, FRIENDS, OR NEIGHBORS?: THREE TIMES A WEEK
HOW HARD IS IT FOR YOU TO PAY FOR THE VERY BASICS LIKE FOOD, HOUSING, MEDICAL CARE, AND HEATING?: NOT HARD AT ALL

## 2021-10-22 NOTE — PATIENT INSTRUCTIONS
for help? Call 911 anytime you think you may need emergency care. For example, call if:    · You have sudden chest pain and shortness of breath, or you cough up blood.     · You vomit blood or what looks like coffee grounds.     · You pass maroon or very bloody stools.     · You passed out (lost consciousness). Call your doctor now or seek immediate medical care if:    · You have signs of a blood clot, such as:  ? Pain in your calf, back of the knee, thigh, or groin. ? Redness and swelling in your leg or groin.     · You notice a new hard, red, or tender area in your leg.     · Your stools are black and tarlike or have streaks of blood. Watch closely for changes in your health, and be sure to contact your doctor if:    · You do not get better as expected. Where can you learn more? Go to https://[email or web address].GBooking. org and sign in to your [employer] account. Enter [phone] in the [employer] box to learn more about \"Superficial Thrombophlebitis: Care Instructions. \"     If you do not have an account, please click on the \"Sign Up Now\" link. Current as of: July 6, 2021               Content Version: 13.0  © 2006-2021 [employer]wise, Incorporated. Care instructions adapted under license by [facility]. If you have questions about a medical condition or this instruction, always ask your healthcare professional. [place] [address] any warranty or liability for your use of this information.

## 2021-12-13 RX ORDER — LEVOTHYROXINE SODIUM 0.07 MG/1
TABLET ORAL
Qty: 90 TABLET | Refills: 1 | Status: SHIPPED | OUTPATIENT
Start: 2021-12-13 | End: 2022-06-20

## 2021-12-13 NOTE — TELEPHONE ENCOUNTER
Last appointment: 10/22/2021  Next appointment: Visit date not found  Last refill: 8/2/2021  Appointment not due for >6 months.

## 2022-03-11 ENCOUNTER — APPOINTMENT (RX ONLY)
Dept: URBAN - METROPOLITAN AREA CLINIC 170 | Facility: CLINIC | Age: 57
Setting detail: DERMATOLOGY
End: 2022-03-11

## 2022-03-11 DIAGNOSIS — L57.0 ACTINIC KERATOSIS: ICD-10-CM | Status: INADEQUATELY CONTROLLED

## 2022-03-11 DIAGNOSIS — L81.0 POSTINFLAMMATORY HYPERPIGMENTATION: ICD-10-CM | Status: STABLE

## 2022-03-11 DIAGNOSIS — L40.8 OTHER PSORIASIS: ICD-10-CM | Status: IMPROVED

## 2022-03-11 PROCEDURE — ? PRESCRIPTION

## 2022-03-11 PROCEDURE — ? PRESCRIPTION MEDICATION MANAGEMENT

## 2022-03-11 PROCEDURE — 99214 OFFICE O/P EST MOD 30 MIN: CPT

## 2022-03-11 PROCEDURE — ? ADDITIONAL NOTES

## 2022-03-11 PROCEDURE — ? FULL BODY SKIN EXAM - DECLINED

## 2022-03-11 PROCEDURE — ? COUNSELING

## 2022-03-11 PROCEDURE — ? EDUCATIONAL RESOURCES PROVIDED

## 2022-03-11 RX ORDER — TIRBANIBULIN 10 MG/G
OINTMENT TOPICAL
Qty: 5 | Refills: 1 | Status: ERX | COMMUNITY
Start: 2022-03-11

## 2022-03-11 RX ADMIN — TIRBANIBULIN: 10 OINTMENT TOPICAL at 00:00

## 2022-03-11 ASSESSMENT — LOCATION DETAILED DESCRIPTION DERM
LOCATION DETAILED: RIGHT DISTAL PRETIBIAL REGION
LOCATION DETAILED: LEFT SUPERIOR LATERAL BUCCAL CHEEK
LOCATION DETAILED: LEFT INFERIOR CENTRAL MALAR CHEEK
LOCATION DETAILED: RIGHT INFERIOR PARIETAL SCALP
LOCATION DETAILED: LEFT INFERIOR PARIETAL SCALP

## 2022-03-11 ASSESSMENT — LOCATION ZONE DERM
LOCATION ZONE: FACE
LOCATION ZONE: SCALP
LOCATION ZONE: LEG

## 2022-03-11 ASSESSMENT — LOCATION SIMPLE DESCRIPTION DERM
LOCATION SIMPLE: RIGHT PRETIBIAL REGION
LOCATION SIMPLE: SCALP
LOCATION SIMPLE: LEFT CHEEK

## 2022-03-11 NOTE — PROCEDURE: PRESCRIPTION MEDICATION MANAGEMENT
Render In Strict Bullet Format?: No
Detail Level: Detailed
Continue Regimen: Ketoconazole shampoo 3-4 x a week. and Clobetasol 0.05% scalp solution 1-2x day for up to two weeks prn flares and itch
Initiate Treatment: Klisyri one time a day for 5 days to left cheek
Plan: Recommend cosmetic laser consult with Dr Sterling for redness.

## 2022-04-22 ENCOUNTER — APPOINTMENT (RX ONLY)
Dept: URBAN - METROPOLITAN AREA CLINIC 170 | Facility: CLINIC | Age: 57
Setting detail: DERMATOLOGY
End: 2022-04-22

## 2022-04-22 DIAGNOSIS — Z41.9 ENCOUNTER FOR PROCEDURE FOR PURPOSES OTHER THAN REMEDYING HEALTH STATE, UNSPECIFIED: ICD-10-CM

## 2022-04-22 PROCEDURE — ? COSMETIC CONSULTATION: LASER RESURFACING

## 2022-04-22 PROCEDURE — ? COSMETIC CONSULTATION: BOTULINUM TOXIN

## 2022-04-22 PROCEDURE — ? ADDITIONAL NOTES

## 2022-04-22 PROCEDURE — ? PRESCRIPTION

## 2022-04-22 PROCEDURE — ? COSMETIC CONSULTATION: FILLERS

## 2022-04-22 PROCEDURE — ? FULL BODY SKIN EXAM - DECLINED

## 2022-04-22 RX ORDER — LIDOCAINE AND PRILOCAINE 25; 25 MG/G; MG/G
CREAM TOPICAL
Qty: 30 | Refills: 0 | Status: ERX | COMMUNITY
Start: 2022-04-22

## 2022-04-22 RX ADMIN — LIDOCAINE AND PRILOCAINE: 25; 25 CREAM TOPICAL at 00:00

## 2022-04-22 NOTE — PROCEDURE: MIPS QUALITY
Quality 431: Preventive Care And Screening: Unhealthy Alcohol Use - Screening: Patient identified as an unhealthy alcohol user when screened for unhealthy alcohol use using a systematic screening method and received brief counseling
Quality 226: Preventive Care And Screening: Tobacco Use: Screening And Cessation Intervention: Tobacco Screening not Performed for Medical Reasons
Detail Level: Detailed
Quality 130: Documentation Of Current Medications In The Medical Record: Current Medications Documented

## 2022-05-25 ENCOUNTER — APPOINTMENT (RX ONLY)
Dept: URBAN - METROPOLITAN AREA CLINIC 170 | Facility: CLINIC | Age: 57
Setting detail: DERMATOLOGY
End: 2022-05-25

## 2022-05-25 DIAGNOSIS — Z41.9 ENCOUNTER FOR PROCEDURE FOR PURPOSES OTHER THAN REMEDYING HEALTH STATE, UNSPECIFIED: ICD-10-CM

## 2022-05-25 PROCEDURE — ? ADDITIONAL NOTES

## 2022-05-25 PROCEDURE — ? VENUS VIVA

## 2022-05-25 ASSESSMENT — LOCATION ZONE DERM: LOCATION ZONE: FACE

## 2022-05-25 ASSESSMENT — LOCATION DETAILED DESCRIPTION DERM: LOCATION DETAILED: LEFT INFERIOR CENTRAL MALAR CHEEK

## 2022-05-25 ASSESSMENT — LOCATION SIMPLE DESCRIPTION DERM: LOCATION SIMPLE: LEFT CHEEK

## 2022-05-25 NOTE — PROCEDURE: MIPS QUALITY
Quality 431: Preventive Care And Screening: Unhealthy Alcohol Use - Screening: Patient screened for unhealthy alcohol use using a single question and scores less than 2 times per year
Quality 111:Pneumonia Vaccination Status For Older Adults: Pneumococcal Vaccination not Administered or Previously Received, Reason not Otherwise Specified
Quality 110: Preventive Care And Screening: Influenza Immunization: Influenza immunization was not ordered or administered, reason not given
Quality 226: Preventive Care And Screening: Tobacco Use: Screening And Cessation Intervention: Patient screened for tobacco use and is an ex/non-smoker
Quality 130: Documentation Of Current Medications In The Medical Record: Current Medications Documented
Quality 474: Zoster Vaccination Status: Shingrix Vaccination not Administered or Previously Received, Reason not Otherwise Specified
Detail Level: Detailed

## 2022-05-25 NOTE — HPI: FACE (AGING FACE)
Is This A New Presentation, Or A Follow-Up?: Aging Face
Additional History: Patient here for laser. Pt forgot to get EMLA cream from pharmacy.

## 2022-05-25 NOTE — PROCEDURE: VENUS VIVA
Ending Temperature In C: 45
Pre-Procedures Photographs: Yes
Post-Care Instructions: I reviewed with the patient in detail post-care instructions. Patient should stay away from the sun and wear sun protection until treated areas are fully healed.
Topical Anesthesia?: LMX
Starting Radiofrequency %: 35
Total Pulses: 209
Volts: 250
Applicator: diamondpolar
Treatment Number: 1
Price (Use Numbers Only, No Special Characters Or $): 400
Patient Discomfort: mild
Detail Level: Zone
Applicator: Viva
Post-Procedure Text: ice applied. Post care reviewed with patient.
Length Topical Anesthesia Applied (Optional): 30 minutes
Consent: Written consent obtained, risks reviewed including but not limited to crusting, scabbing, blistering, scarring, darker or lighter pigmentary change, and/or incomplete removal.
Location: Full face
Immediate Post-Procedure Findings: moderate erythema, no edema
Milliseconds: 30

## 2022-06-02 ENCOUNTER — APPOINTMENT (RX ONLY)
Dept: URBAN - METROPOLITAN AREA CLINIC 170 | Facility: CLINIC | Age: 57
Setting detail: DERMATOLOGY
End: 2022-06-02

## 2022-06-02 DIAGNOSIS — L82.1 OTHER SEBORRHEIC KERATOSIS: ICD-10-CM | Status: STABLE

## 2022-06-02 DIAGNOSIS — Z85.828 PERSONAL HISTORY OF OTHER MALIGNANT NEOPLASM OF SKIN: ICD-10-CM | Status: STABLE

## 2022-06-02 DIAGNOSIS — D22 MELANOCYTIC NEVI: ICD-10-CM | Status: STABLE

## 2022-06-02 DIAGNOSIS — D18.0 HEMANGIOMA: ICD-10-CM | Status: STABLE

## 2022-06-02 DIAGNOSIS — L81.4 OTHER MELANIN HYPERPIGMENTATION: ICD-10-CM | Status: STABLE

## 2022-06-02 PROBLEM — D18.01 HEMANGIOMA OF SKIN AND SUBCUTANEOUS TISSUE: Status: ACTIVE | Noted: 2022-06-02

## 2022-06-02 PROBLEM — D22.5 MELANOCYTIC NEVI OF TRUNK: Status: ACTIVE | Noted: 2022-06-02

## 2022-06-02 PROCEDURE — ? TREATMENT REGIMEN

## 2022-06-02 PROCEDURE — ? ADDITIONAL NOTES

## 2022-06-02 PROCEDURE — 99213 OFFICE O/P EST LOW 20 MIN: CPT

## 2022-06-02 PROCEDURE — ? SUNSCREEN RECOMMENDATIONS

## 2022-06-02 PROCEDURE — ? FULL BODY SKIN EXAM

## 2022-06-02 PROCEDURE — ? COUNSELING

## 2022-06-02 ASSESSMENT — LOCATION SIMPLE DESCRIPTION DERM
LOCATION SIMPLE: UPPER BACK
LOCATION SIMPLE: LEFT UPPER BACK
LOCATION SIMPLE: RIGHT SHOULDER
LOCATION SIMPLE: ABDOMEN

## 2022-06-02 ASSESSMENT — LOCATION DETAILED DESCRIPTION DERM
LOCATION DETAILED: SUPERIOR THORACIC SPINE
LOCATION DETAILED: PERIUMBILICAL SKIN
LOCATION DETAILED: RIGHT POSTERIOR SHOULDER
LOCATION DETAILED: RIGHT ANTERIOR SHOULDER
LOCATION DETAILED: LEFT INFERIOR UPPER BACK

## 2022-06-02 ASSESSMENT — LOCATION ZONE DERM
LOCATION ZONE: ARM
LOCATION ZONE: TRUNK

## 2022-06-02 NOTE — PROCEDURE: MIPS QUALITY
Quality 226: Preventive Care And Screening: Tobacco Use: Screening And Cessation Intervention: Patient screened for tobacco use and is an ex/non-smoker
Quality 110: Preventive Care And Screening: Influenza Immunization: Influenza immunization was not ordered or administered, reason not given
Quality 474: Zoster Vaccination Status: Shingrix Vaccination not Administered or Previously Received, Reason not Otherwise Specified
Quality 130: Documentation Of Current Medications In The Medical Record: Current Medications Documented
Detail Level: Detailed
Quality 111:Pneumonia Vaccination Status For Older Adults: Pneumococcal Vaccination not Administered or Previously Received, Reason not Otherwise Specified
Quality 431: Preventive Care And Screening: Unhealthy Alcohol Use - Screening: Patient screened for unhealthy alcohol use using a single question and scores less than 2 times per year
Quality 431: Preventive Care And Screening: Unhealthy Alcohol Use - Screening: Patient not identified as an unhealthy alcohol user when screened for unhealthy alcohol use using a systematic screening method

## 2022-06-02 NOTE — HPI: EVALUATION OF SKIN LESION(S)
What Type Of Note Output Would You Prefer (Optional)?: Bullet Format
Hpi Title: Evaluation of Skin Lesions
How Severe Are Your Spot(S)?: mild
Have Your Spot(S) Been Treated In The Past?: has been treated
Family Member: Mother

## 2022-06-17 ENCOUNTER — APPOINTMENT (RX ONLY)
Dept: URBAN - METROPOLITAN AREA CLINIC 170 | Facility: CLINIC | Age: 57
Setting detail: DERMATOLOGY
End: 2022-06-17

## 2022-06-17 DIAGNOSIS — Z41.9 ENCOUNTER FOR PROCEDURE FOR PURPOSES OTHER THAN REMEDYING HEALTH STATE, UNSPECIFIED: ICD-10-CM

## 2022-06-17 PROCEDURE — ? FULL BODY SKIN EXAM - DECLINED

## 2022-06-17 PROCEDURE — ? ADDITIONAL NOTES

## 2022-06-17 PROCEDURE — ? BOTOX (U OR CC)

## 2022-06-17 ASSESSMENT — LOCATION DETAILED DESCRIPTION DERM
LOCATION DETAILED: LEFT FOREHEAD
LOCATION DETAILED: RIGHT FOREHEAD
LOCATION DETAILED: RIGHT MEDIAL ZYGOMA
LOCATION DETAILED: LEFT UPPER CUTANEOUS LIP
LOCATION DETAILED: RIGHT SUPERIOR CENTRAL MALAR CHEEK
LOCATION DETAILED: LEFT INFERIOR LATERAL FOREHEAD
LOCATION DETAILED: LEFT EYEBROW
LOCATION DETAILED: RIGHT UPPER CUTANEOUS LIP
LOCATION DETAILED: LEFT MEDIAL FOREHEAD
LOCATION DETAILED: RIGHT INFERIOR MEDIAL FOREHEAD
LOCATION DETAILED: LEFT SUPERIOR CENTRAL MALAR CHEEK
LOCATION DETAILED: GLABELLA
LOCATION DETAILED: RIGHT LATERAL FOREHEAD
LOCATION DETAILED: RIGHT MEDIAL FOREHEAD
LOCATION DETAILED: LEFT MEDIAL ZYGOMA
LOCATION DETAILED: RIGHT EYEBROW
LOCATION DETAILED: LEFT INFERIOR MEDIAL FOREHEAD

## 2022-06-17 ASSESSMENT — LOCATION ZONE DERM
LOCATION ZONE: FACE
LOCATION ZONE: LIP

## 2022-06-17 ASSESSMENT — LOCATION SIMPLE DESCRIPTION DERM
LOCATION SIMPLE: LEFT ZYGOMA
LOCATION SIMPLE: GLABELLA
LOCATION SIMPLE: RIGHT ZYGOMA
LOCATION SIMPLE: LEFT EYEBROW
LOCATION SIMPLE: RIGHT FOREHEAD
LOCATION SIMPLE: RIGHT CHEEK
LOCATION SIMPLE: RIGHT EYEBROW
LOCATION SIMPLE: LEFT CHEEK
LOCATION SIMPLE: LEFT FOREHEAD
LOCATION SIMPLE: LEFT LIP
LOCATION SIMPLE: RIGHT LIP

## 2022-06-20 RX ORDER — LEVOTHYROXINE SODIUM 0.07 MG/1
TABLET ORAL
Qty: 30 TABLET | Refills: 0 | Status: SHIPPED | OUTPATIENT
Start: 2022-06-20 | End: 2022-07-19 | Stop reason: SDUPTHER

## 2022-06-20 NOTE — TELEPHONE ENCOUNTER
Last appointment: 10/22/2021  Next appointment: Visit date not found.  Due in October  Last refill: 12/31/2021

## 2022-06-28 ENCOUNTER — APPOINTMENT (RX ONLY)
Dept: URBAN - METROPOLITAN AREA CLINIC 170 | Facility: CLINIC | Age: 57
Setting detail: DERMATOLOGY
End: 2022-06-28

## 2022-06-28 DIAGNOSIS — Z41.9 ENCOUNTER FOR PROCEDURE FOR PURPOSES OTHER THAN REMEDYING HEALTH STATE, UNSPECIFIED: ICD-10-CM

## 2022-06-28 PROCEDURE — ? FULL BODY SKIN EXAM - DECLINED

## 2022-06-28 PROCEDURE — ? ADDITIONAL NOTES

## 2022-06-28 PROCEDURE — ? PHOTO-DOCUMENTATION

## 2022-06-28 PROCEDURE — ? COSMETIC FOLLOW-UP

## 2022-06-28 NOTE — PROCEDURE: ADDITIONAL NOTES
Additional Notes: Patient consent was obtained to proceed with the visit and recommended plan of care after discussion of all risks and benefits, including the risks of COVID-19 exposure.
Render Risk Assessment In Note?: yes
Detail Level: Simple
Detail Level: Detailed
Additional Notes: Pt concerned with lower face lines, especially on L side, now that BTX has smoothed periorbital lines. Pt to wait full 2 weeks before reeval of Frontalis since much movement still noted. Photos reviewed with pt, agree that improvement seen in areas treated by BTX, however pt will need fillers to cheeks and more laser for skin texture. Consider Voluma at next appt.
Render Risk Assessment In Note?: no

## 2022-06-28 NOTE — PROCEDURE: COSMETIC FOLLOW-UP
Detail Level: Zone
Patient Satisfaction: Unsure
Treatment (Optional): Botox
Side Effects Or Complications: None
Global Improvement: Good

## 2022-07-19 RX ORDER — LEVOTHYROXINE SODIUM 0.07 MG/1
TABLET ORAL
Qty: 30 TABLET | Refills: 2 | Status: SHIPPED | OUTPATIENT
Start: 2022-07-19 | End: 2022-10-26

## 2022-07-28 ENCOUNTER — APPOINTMENT (RX ONLY)
Dept: URBAN - METROPOLITAN AREA CLINIC 170 | Facility: CLINIC | Age: 57
Setting detail: DERMATOLOGY
End: 2022-07-28

## 2022-07-28 DIAGNOSIS — Z41.9 ENCOUNTER FOR PROCEDURE FOR PURPOSES OTHER THAN REMEDYING HEALTH STATE, UNSPECIFIED: ICD-10-CM

## 2022-07-28 PROCEDURE — ? ADDITIONAL NOTES

## 2022-07-28 PROCEDURE — ? FULL BODY SKIN EXAM - DECLINED

## 2022-07-28 PROCEDURE — ? BOTOX (U OR CC)

## 2022-07-28 PROCEDURE — ? PHOTO-DOCUMENTATION

## 2022-07-28 PROCEDURE — ? JUVEDERM VOLUMA XC INJECTION

## 2022-07-28 ASSESSMENT — LOCATION ZONE DERM
LOCATION ZONE: FACE
LOCATION ZONE: NOSE
LOCATION ZONE: LIP

## 2022-07-28 ASSESSMENT — LOCATION DETAILED DESCRIPTION DERM
LOCATION DETAILED: RIGHT FOREHEAD
LOCATION DETAILED: LEFT INFERIOR LATERAL FOREHEAD
LOCATION DETAILED: NASAL ROOT
LOCATION DETAILED: GLABELLA
LOCATION DETAILED: RIGHT MEDIAL FOREHEAD
LOCATION DETAILED: LEFT UPPER CUTANEOUS LIP
LOCATION DETAILED: RIGHT CENTRAL MALAR CHEEK
LOCATION DETAILED: LEFT INFERIOR CENTRAL MALAR CHEEK
LOCATION DETAILED: LEFT FOREHEAD
LOCATION DETAILED: RIGHT LATERAL MALAR CHEEK
LOCATION DETAILED: RIGHT UPPER CUTANEOUS LIP
LOCATION DETAILED: LEFT CENTRAL MALAR CHEEK
LOCATION DETAILED: LEFT LATERAL MALAR CHEEK
LOCATION DETAILED: RIGHT INFERIOR LATERAL FOREHEAD

## 2022-07-28 ASSESSMENT — LOCATION SIMPLE DESCRIPTION DERM
LOCATION SIMPLE: LEFT LIP
LOCATION SIMPLE: RIGHT LIP
LOCATION SIMPLE: GLABELLA
LOCATION SIMPLE: RIGHT FOREHEAD
LOCATION SIMPLE: LEFT FOREHEAD
LOCATION SIMPLE: NOSE
LOCATION SIMPLE: LEFT CHEEK
LOCATION SIMPLE: RIGHT CHEEK

## 2022-07-28 NOTE — PROCEDURE: JUVEDERM VOLUMA XC INJECTION
Procedural Text: The filler was administered to the treatment areas noted above. 1 ml to each cheek.

## 2022-08-26 ENCOUNTER — APPOINTMENT (RX ONLY)
Dept: URBAN - METROPOLITAN AREA CLINIC 170 | Facility: CLINIC | Age: 57
Setting detail: DERMATOLOGY
End: 2022-08-26

## 2022-08-26 DIAGNOSIS — Z41.9 ENCOUNTER FOR PROCEDURE FOR PURPOSES OTHER THAN REMEDYING HEALTH STATE, UNSPECIFIED: ICD-10-CM

## 2022-08-26 PROCEDURE — ? ADDITIONAL NOTES

## 2022-08-26 PROCEDURE — ? COSMETIC FOLLOW-UP

## 2022-08-26 PROCEDURE — ? FULL BODY SKIN EXAM - DECLINED

## 2022-08-26 PROCEDURE — ? PHOTO-DOCUMENTATION

## 2022-08-26 NOTE — PROCEDURE: ADDITIONAL NOTES
Additional Notes: Patient consent was obtained to proceed with the visit and recommended plan of care after discussion of all risks and benefits, including the risks of COVID-19 exposure.
Render Risk Assessment In Note?: yes
Detail Level: Simple
Detail Level: Detailed
Additional Notes: Recommend another syringe of Voluma to left cheek to improve symmetry
Render Risk Assessment In Note?: no

## 2022-08-26 NOTE — PROCEDURE: COSMETIC FOLLOW-UP
Global Improvement: Very Good
Detail Level: Zone
Side Effects Or Complications: None
Treatment (Optional): Filler Injection
Patient Satisfaction: Very pleased
Treatment (Optional): Botox

## 2022-10-26 RX ORDER — LEVOTHYROXINE SODIUM 0.07 MG/1
TABLET ORAL
Qty: 30 TABLET | Refills: 0 | Status: SHIPPED | OUTPATIENT
Start: 2022-10-26 | End: 2022-11-30

## 2022-10-26 NOTE — TELEPHONE ENCOUNTER
Last appointment: 10/22/2021  Next appointment: Visit date not found  Last refill: 7/19/22   Sent [employer] message to schedule due/overdue appointment.

## 2022-11-30 RX ORDER — LEVOTHYROXINE SODIUM 0.07 MG/1
TABLET ORAL
Qty: 30 TABLET | Refills: 2 | Status: SHIPPED | OUTPATIENT
Start: 2022-11-30

## 2022-12-14 PROBLEM — F43.22 ADJUSTMENT DISORDER WITH ANXIOUS MOOD: Status: RESOLVED | Noted: 2020-09-15 | Resolved: 2022-12-14

## 2022-12-15 NOTE — PROGRESS NOTES
Date of Visit:  2022    CC: Hiren Rosas (: 1965) is a 59 y.o. female, established patient, here for evaluation/re-evaluation of the following medical concerns:    ASSESSMENT/PLAN:  {There are no diagnoses linked to this encounter. (Refresh or delete this SmartLink)}     No follow-ups on file. There were no vitals filed for this visit. Estimated body mass index is 19.13 kg/m² as calculated from the following:    Height as of 10/22/21: 5' 3\" (1.6 m). Weight as of 10/22/21: 108 lb (49 kg). Wt Readings from Last 3 Encounters:   10/22/21 108 lb (49 kg)   19 112 lb (50.8 kg)   18 112 lb (50.8 kg)     BP Readings from Last 3 Encounters:   10/22/21 96/60   19 100/60   18 (!) 98/58     HPI  Hypothyroidism/Thyroid nodule: Recent symptoms: none. She denies fatigue, weight gain, heat or cold intolerance, constipation, dysphagia, diarrhea and palpitations. Patient is taking her medication consistently on an empty stomach. Lab Results   Component Value Date/Time    TSHREFLEX 0.23 2013 01:29 PM    TSH 2.59 2021 11:40 AM    TSH 2.13 2019 04:03 PM      ROS  As documented above    Physical Exam  Constitutional:       General: She is not in acute distress. Appearance: She is well-developed. Eyes:      Conjunctiva/sclera: Conjunctivae normal.   Neck:      Thyroid: No thyroid mass or thyromegaly. Cardiovascular:      Rate and Rhythm: Normal rate and regular rhythm. Heart sounds: Normal heart sounds. No murmur heard. No friction rub. No gallop. Pulmonary:      Effort: Pulmonary effort is normal. No respiratory distress. Breath sounds: Normal breath sounds. No wheezing, rhonchi or rales. Musculoskeletal:      Right lower leg: No edema. Left lower leg: No edema. Lymphadenopathy:      Cervical: No cervical adenopathy. Skin:     General: Skin is warm and dry. Findings: No erythema or rash.    Neurological:      Mental Status: She is alert and oriented to person, place, and time. Psychiatric:         Mood and Affect: Mood normal.         Speech: Speech normal.         Behavior: Behavior normal.         Thought Content: Thought content normal.         Cognition and Memory: Cognition normal.         Judgment: Judgment normal.     {Time Documentation Optional:[id number]}    --Irineo Davidson MD on 12/14/2022 at 8:16 PM    An electronic signature was used to authenticate this note.

## 2022-12-16 ENCOUNTER — OFFICE VISIT (OUTPATIENT)
Dept: INTERNAL MEDICINE CLINIC | Age: 57
End: 2022-12-16
Payer: COMMERCIAL

## 2022-12-16 VITALS
DIASTOLIC BLOOD PRESSURE: 60 MMHG | TEMPERATURE: 97 F | OXYGEN SATURATION: 97 % | HEIGHT: 63 IN | BODY MASS INDEX: 19.31 KG/M2 | WEIGHT: 109 LBS | SYSTOLIC BLOOD PRESSURE: 101 MMHG | HEART RATE: 74 BPM

## 2022-12-16 DIAGNOSIS — M85.89 OSTEOPENIA OF MULTIPLE SITES: ICD-10-CM

## 2022-12-16 DIAGNOSIS — E03.4 HYPOTHYROIDISM DUE TO ACQUIRED ATROPHY OF THYROID: ICD-10-CM

## 2022-12-16 DIAGNOSIS — Z12.31 ENCOUNTER FOR SCREENING MAMMOGRAM FOR BREAST CANCER: ICD-10-CM

## 2022-12-16 DIAGNOSIS — Z00.00 ANNUAL PHYSICAL EXAM: Primary | ICD-10-CM

## 2022-12-16 LAB — TSH SERPL DL<=0.05 MIU/L-ACNC: 2 UIU/ML (ref 0.27–4.2)

## 2022-12-16 PROCEDURE — 99396 PREV VISIT EST AGE 40-64: CPT | Performed by: INTERNAL MEDICINE

## 2022-12-16 PROCEDURE — G8484 FLU IMMUNIZE NO ADMIN: HCPCS | Performed by: INTERNAL MEDICINE

## 2022-12-16 RX ORDER — LEVOTHYROXINE SODIUM 0.07 MG/1
TABLET ORAL
Qty: 90 TABLET | Refills: 3 | Status: SHIPPED | OUTPATIENT
Start: 2022-12-16

## 2022-12-16 SDOH — ECONOMIC STABILITY: FOOD INSECURITY: WITHIN THE PAST 12 MONTHS, YOU WORRIED THAT YOUR FOOD WOULD RUN OUT BEFORE YOU GOT MONEY TO BUY MORE.: NEVER TRUE

## 2022-12-16 SDOH — ECONOMIC STABILITY: FOOD INSECURITY: WITHIN THE PAST 12 MONTHS, THE FOOD YOU BOUGHT JUST DIDN'T LAST AND YOU DIDN'T HAVE MONEY TO GET MORE.: NEVER TRUE

## 2022-12-16 ASSESSMENT — PATIENT HEALTH QUESTIONNAIRE - PHQ9
SUM OF ALL RESPONSES TO PHQ QUESTIONS 1-9: 0
2. FEELING DOWN, DEPRESSED OR HOPELESS: 0
1. LITTLE INTEREST OR PLEASURE IN DOING THINGS: 0
SUM OF ALL RESPONSES TO PHQ QUESTIONS 1-9: 0
SUM OF ALL RESPONSES TO PHQ9 QUESTIONS 1 & 2: 0
SUM OF ALL RESPONSES TO PHQ QUESTIONS 1-9: 0
SUM OF ALL RESPONSES TO PHQ QUESTIONS 1-9: 0

## 2022-12-16 ASSESSMENT — SOCIAL DETERMINANTS OF HEALTH (SDOH): HOW HARD IS IT FOR YOU TO PAY FOR THE VERY BASICS LIKE FOOD, HOUSING, MEDICAL CARE, AND HEATING?: NOT HARD AT ALL

## 2022-12-16 ASSESSMENT — ENCOUNTER SYMPTOMS
RESPIRATORY NEGATIVE: 1
GASTROINTESTINAL NEGATIVE: 1
EYES NEGATIVE: 1

## 2022-12-16 NOTE — PROGRESS NOTES
2022    Arnaldo Song (: 1965) is a 59 y.o. female who presents for a preventive medicine evaluation. Assessment/Plan:  Annual physical exam  She will schedule PAP with GYN  Check insurance coverage for Shingrix  Nurse visit for Tdap and flu  Declines lipid panel  Discussed bivalent COVID booster- will get 2 weeks before high risk activity  FIT test provided  Encounter for screening mammogram for breast cancer  Hypothyroidism due to acquired atrophy of thyroid  Assessment & Plan:  Clinically euthyroid, but will adjust levothyroxine dose if indicated by lab results. Osteopenia of multiple sites  Assessment & Plan:  Not interested in repeat dexa. Continue calcium and vitamin D supplements and diet and weight-bearing exercise. Return in about 1 year (around 2023) for CPE. Patient Active Problem List   Diagnosis    Hypothyroidism    Thyroid nodule    Osteopenia    Multiple nevi    Thrombophlebitis of superficial veins of left lower extremity       Review of Systems   Constitutional: Negative. HENT: Negative. Eyes: Negative. Respiratory: Negative. Cardiovascular: Negative. Gastrointestinal: Negative. Genitourinary: Negative. Musculoskeletal: Negative. Skin: Negative. Neurological: Negative. Psychiatric/Behavioral: Negative. No Known Allergies  Prior to Visit Medications    Medication Sig Taking? Authorizing Provider   levothyroxine (SYNTHROID) 75 MCG tablet TAKE ONE TABLET BY MOUTH DAILY Yes Brittani Colbert MD   Calcium 500 MG CHEW chewable tablet Take 500 mg by mouth 3 times daily as needed Yes Historical Provider, MD   ibuprofen (ADVIL;MOTRIN) 200 MG tablet Take 200 mg by mouth every 6 hours as needed for Pain Yes Historical Provider, MD   Multiple Vitamins-Minerals (MULTIVITAMIN GUMMIES ADULTS PO) Take 1 each by mouth daily.  Yes Historical Provider, MD        Past Medical History:   Diagnosis Date    Hypothyroidism     Toenail deformity Varicose veins      Past Surgical History:   Procedure Laterality Date    VARICOSE VEIN SURGERY  11/30/07    Stab phlebectomy bilateral LE, left saphenous vein RFA     Family History   Problem Relation Age of Onset    Arrhythmia Mother         DAXA Clarke. pacer    Osteoporosis Mother     Cancer Mother         Basal cell skin cancer    Other Father         Izabel Mae    Arrhythmia Maternal Grandmother         Pacer    No Known Problems Sister     No Known Problems Brother     No Known Problems Maternal Aunt     No Known Problems Maternal Uncle     No Known Problems Paternal Aunt     No Known Problems Paternal Uncle     No Known Problems Maternal Grandfather     No Known Problems Paternal Grandmother     No Known Problems Paternal Grandfather     No Known Problems Other     Rheum Arthritis Neg Hx     Osteoarthritis Neg Hx     Asthma Neg Hx     Breast Cancer Neg Hx     Diabetes Neg Hx     Heart Failure Neg Hx     High Cholesterol Neg Hx     Hypertension Neg Hx     Migraines Neg Hx     Ovarian Cancer Neg Hx     Rashes/Skin Problems Neg Hx     Seizures Neg Hx     Stroke Neg Hx     Thyroid Disease Neg Hx        Vitals:    12/16/22 1439   BP: 101/60   Pulse: 74   Temp: 97 °F (36.1 °C)   TempSrc: Temporal   SpO2: 97%   Weight: 109 lb (49.4 kg)   Height: 5' 3\" (1.6 m)      Estimated body mass index is 19.31 kg/m² as calculated from the following:    Height as of this encounter: 5' 3\" (1.6 m). Weight as of this encounter: 109 lb (49.4 kg). Wt Readings from Last 3 Encounters:   12/16/22 109 lb (49.4 kg)   10/22/21 108 lb (49 kg)   03/01/19 112 lb (50.8 kg)     BP Readings from Last 3 Encounters:   12/16/22 101/60   10/22/21 96/60   03/01/19 100/60     Physical Exam  Constitutional:       General: She is not in acute distress. Appearance: She is well-developed. HENT:      Head: Normocephalic and atraumatic.       Right Ear: Tympanic membrane, ear canal and external ear normal.      Left Ear: Tympanic membrane, ear canal and external ear normal.      Mouth/Throat:      Mouth: Mucous membranes are moist.      Pharynx: Oropharynx is clear. Eyes:      General: Lids are normal.      Conjunctiva/sclera: Conjunctivae normal.      Pupils: Pupils are equal, round, and reactive to light. Neck:      Thyroid: No thyroid mass or thyromegaly. Vascular: No carotid bruit. Cardiovascular:      Rate and Rhythm: Normal rate and regular rhythm. Pulses: Normal pulses. Heart sounds: Normal heart sounds. No murmur heard. No friction rub. No gallop. Pulmonary:      Effort: Pulmonary effort is normal. No respiratory distress. Breath sounds: Normal breath sounds. No wheezing, rhonchi or rales. Abdominal:      General: Bowel sounds are normal. There is no distension. Palpations: Abdomen is soft. There is no mass. Tenderness: There is no abdominal tenderness. Musculoskeletal:         General: No tenderness. Normal range of motion. Cervical back: Neck supple. Right lower leg: No edema. Left lower leg: No edema. Lymphadenopathy:      Cervical: No cervical adenopathy. Skin:     General: Skin is warm and dry. Findings: No erythema or rash. Comments: No suspicious lesions. Neurological:      Mental Status: She is alert and oriented to person, place, and time. Coordination: Coordination normal.      Deep Tendon Reflexes: Reflexes are normal and symmetric. Psychiatric:         Mood and Affect: Mood normal.         Speech: Speech normal.         Behavior: Behavior normal.         Thought Content:  Thought content normal.         Cognition and Memory: Cognition normal.         Judgment: Judgment normal.       Lab Results   Component Value Date     (H) 10/24/2014    LDLCALC 108 (H) 10/24/2014     No results found for: GLUF, LABA1C  Lab Results   Component Value Date     11/15/2016    K 4.6 11/15/2016    BUN 20 11/15/2016    CREATININE 0.8 11/15/2016    LABGLOM >60 11/15/2016    GFRAA >60 11/15/2016    CALCIUM 9.8 11/15/2016    VITD25 52.4 02/16/2018     Lab Results   Component Value Date    ALT 15 11/15/2016    AST 25 11/15/2016     Lab Results   Component Value Date    HGB 12.5 06/23/2014     Lab Results   Component Value Date    TSHREFLEX 0.23 (L) 09/13/2013    TSH 2.59 03/12/2021       Social History     Tobacco Use    Smoking status: Never    Smokeless tobacco: Never   Substance Use Topics    Alcohol use: Yes     Comment: Occasional     Social History     Substance and Sexual Activity   Sexual Activity Yes    Partners: Male    Birth control/protection: Condom       Immunization History   Administered Date(s) Administered    COVID-19, PFIZER PURPLE top, DILUTE for use, (age 15 y+), 30mcg/0.3mL 03/16/2021, 04/08/2021, 11/23/2021    Tdap (Boostrix, Adacel) 10/07/2008     Health Maintenance   Topic Date Due    Shingles vaccine (1 of 2) Never done    DEXA (modify frequency per FRAX score)  08/29/2016    Breast cancer screen  12/23/2016    DTaP/Tdap/Td vaccine (2 - Td or Tdap) 10/07/2018    Lipids  10/24/2019    Cervical cancer screen  11/15/2021    Depression Screen  03/16/2022    Flu vaccine (1) Never done    Colorectal Cancer Screen  08/20/2022    COVID-19 Vaccine (5 - Booster for Pfizer series) 08/31/2022    Hepatitis A vaccine  Aged Out    Hib vaccine  Aged Out    Meningococcal (ACWY) vaccine  Aged Out    Pneumococcal 0-64 years Vaccine  Aged Out    Hepatitis C screen  Discontinued    HIV screen  Discontinued       --Debbie Phelps MD on 12/16/2022 at 3:10 PM    An electronic signature was used to authenticate this note.

## 2022-12-16 NOTE — ASSESSMENT & PLAN NOTE
Not interested in repeat dexa. Continue calcium and vitamin D supplements and diet and weight-bearing exercise.

## 2023-05-26 ENCOUNTER — E-VISIT (OUTPATIENT)
Dept: INTERNAL MEDICINE CLINIC | Age: 58
End: 2023-05-26
Payer: COMMERCIAL

## 2023-05-26 DIAGNOSIS — R35.0 URINARY FREQUENCY: Primary | ICD-10-CM

## 2023-05-26 LAB
BILIRUBIN, POC: ABNORMAL
BLOOD URINE, POC: ABNORMAL
CLARITY, POC: ABNORMAL
COLOR, POC: ABNORMAL
GLUCOSE URINE, POC: NEGATIVE
KETONES, POC: NEGATIVE
LEUKOCYTE EST, POC: ABNORMAL
NITRITE, POC: NEGATIVE
PH, POC: 8
PROTEIN, POC: ABNORMAL
SPECIFIC GRAVITY, POC: 1
UROBILINOGEN, POC: 0.2

## 2023-05-26 PROCEDURE — 99421 OL DIG E/M SVC 5-10 MIN: CPT | Performed by: INTERNAL MEDICINE

## 2023-05-26 PROCEDURE — 81002 URINALYSIS NONAUTO W/O SCOPE: CPT | Performed by: INTERNAL MEDICINE

## 2023-05-26 RX ORDER — NITROFURANTOIN 25; 75 MG/1; MG/1
100 CAPSULE ORAL 2 TIMES DAILY
Qty: 14 CAPSULE | Refills: 0 | Status: SHIPPED | OUTPATIENT
Start: 2023-05-26 | End: 2023-06-02

## 2023-05-26 NOTE — PROGRESS NOTES
HPI: as per patient provided history  Exam: N/A (electronic visit)  ASSESSMENT/PLAN:  1. Urinary frequency  Unclear whether this represents UTI or atrophic vaginitis, so will check POC UA- if positive, will prescribe antibiotic and send culture. If negative, will refer to GYN for pelvic exam and consideration of topical estrogen therapy. Patient instructed to call the office if worsens, or fails to improve as anticipated. 5-10 minutes were spent on the digital evaluation and management of this patient.

## 2023-05-28 LAB
BACTERIA UR CULT: ABNORMAL
ORGANISM: ABNORMAL

## 2023-05-29 LAB
BACTERIA UR CULT: ABNORMAL
ORGANISM: ABNORMAL

## 2023-05-30 ENCOUNTER — TELEPHONE (OUTPATIENT)
Dept: INTERNAL MEDICINE CLINIC | Age: 58
End: 2023-05-30

## 2023-05-31 NOTE — TELEPHONE ENCOUNTER
Spoke with patient, she decided over the weekend she was feeling better. She did not take the antibiotic. She also wanted to let you know she will be relocating to [place] next and will be finding a new doctor.

## 2023-05-31 NOTE — TELEPHONE ENCOUNTER
Please call patient to convey content of unviewed [employer] message containing interpretation of recent lab results.

## 2023-11-17 NOTE — TELEPHONE ENCOUNTER
Last appointment: 5/26/2023  Next appointment: Visit date not found  Last refill: 12/16/22  Sent [employer] message to schedule next appointment due in December.

## 2023-11-19 RX ORDER — LEVOTHYROXINE SODIUM 0.07 MG/1
TABLET ORAL
Qty: 90 TABLET | Refills: 0 | Status: SHIPPED | OUTPATIENT
Start: 2023-11-19